# Patient Record
Sex: FEMALE | Race: WHITE | Employment: FULL TIME | URBAN - METROPOLITAN AREA
[De-identification: names, ages, dates, MRNs, and addresses within clinical notes are randomized per-mention and may not be internally consistent; named-entity substitution may affect disease eponyms.]

---

## 2017-05-04 ENCOUNTER — EMPLOYEE WELLNESS (OUTPATIENT)
Dept: OTHER | Age: 57
End: 2017-05-04

## 2017-05-04 LAB
CHOLESTEROL, TOTAL: 183 MG/DL (ref 160–199)
GLUCOSE BLD-MCNC: 88 MG/DL (ref 74–109)
HDLC SERPL-MCNC: 60 MG/DL (ref 65–121)
LDL CHOLESTEROL CALCULATED: 93 MG/DL
TRIGL SERPL-MCNC: 152 MG/DL (ref 150–199)

## 2018-03-20 VITALS — WEIGHT: 117 LBS

## 2022-04-09 ENCOUNTER — OFFICE VISIT (OUTPATIENT)
Age: 62
End: 2022-04-09
Payer: COMMERCIAL

## 2022-04-09 VITALS
OXYGEN SATURATION: 96 % | BODY MASS INDEX: 20.2 KG/M2 | TEMPERATURE: 98 F | HEART RATE: 70 BPM | DIASTOLIC BLOOD PRESSURE: 62 MMHG | RESPIRATION RATE: 18 BRPM | SYSTOLIC BLOOD PRESSURE: 110 MMHG | HEIGHT: 63 IN | WEIGHT: 114 LBS

## 2022-04-09 DIAGNOSIS — A08.4 VIRAL GASTROENTERITIS: Primary | ICD-10-CM

## 2022-04-09 PROCEDURE — 99203 OFFICE O/P NEW LOW 30 MIN: CPT

## 2022-04-09 RX ORDER — HYDROCODONE BITARTRATE AND ACETAMINOPHEN 7.5; 325 MG/1; MG/1
1 TABLET ORAL 3 TIMES DAILY
Status: ON HOLD | COMMUNITY
Start: 2022-04-01 | End: 2022-10-14 | Stop reason: HOSPADM

## 2022-04-09 RX ORDER — ONDANSETRON 4 MG/1
4 TABLET, ORALLY DISINTEGRATING ORAL EVERY 8 HOURS PRN
Qty: 20 TABLET | Refills: 0 | Status: ON HOLD | OUTPATIENT
Start: 2022-04-09 | End: 2022-10-14 | Stop reason: HOSPADM

## 2022-04-09 RX ORDER — OMEPRAZOLE 20 MG/1
CAPSULE, DELAYED RELEASE ORAL
Status: ON HOLD | COMMUNITY
Start: 2022-03-21 | End: 2022-10-14 | Stop reason: HOSPADM

## 2022-04-09 RX ORDER — CITALOPRAM 20 MG/1
20 TABLET ORAL DAILY
Status: ON HOLD | COMMUNITY
Start: 2022-03-21 | End: 2022-10-14 | Stop reason: HOSPADM

## 2022-04-09 RX ORDER — FENOFIBRATE 160 MG/1
160 TABLET ORAL DAILY
COMMUNITY
Start: 2022-03-21

## 2022-04-09 RX ORDER — PRAVASTATIN SODIUM 40 MG
TABLET ORAL
COMMUNITY
Start: 2022-03-21

## 2022-04-09 RX ORDER — ALPRAZOLAM 1 MG/1
1 TABLET ORAL NIGHTLY
Status: ON HOLD | COMMUNITY
Start: 2022-03-21 | End: 2022-10-14 | Stop reason: HOSPADM

## 2022-04-09 ASSESSMENT — ENCOUNTER SYMPTOMS
DIARRHEA: 1
ABDOMINAL DISTENTION: 1
ABDOMINAL PAIN: 1
NAUSEA: 1
VOMITING: 0

## 2022-04-09 NOTE — PATIENT INSTRUCTIONS
1. Zofran as needed for nausea/vomiting  2. Rest  3. Hydrate with water or gatorade, popsicles or ice chips  4. If symptoms worsen, follow up with PCP or return to urgent care  5. Immodium as needed to slow diarrhea, do not try to stop it completely  6. Slowly introduce BRAT diet- bananas, rice, applesauce and toast  7. If abdominal pain is no longer crampy and generalized and is in one spot - seek reevaluation      Patient Education        Gastroenteritis: Care Instructions  Overview     Gastroenteritis is an illness that may cause nausea, vomiting, and diarrhea. Itcan be caused by bacteria or a virus. You will probably begin to feel better in 1 to 2 days. In the meantime, get plenty of rest and make sure you do not become dehydrated. Dehydration occurswhen your body loses too much fluid. Follow-up care is a key part of your treatment and safety. Be sure to make and go to all appointments, and call your doctor if you are having problems. It's also a good idea to know your test results and keep alist of the medicines you take. How can you care for yourself at home?  If your doctor prescribed antibiotics, take them as directed. Do not stop taking them just because you feel better. You need to take the full course of antibiotics.  Drink plenty of fluids to prevent dehydration. Choose water and other clear liquids until you feel better. If you have kidney, heart, or liver disease and have to limit fluids, talk with your doctor before you increase your fluid intake.  Drink fluids slowly, in frequent, small amounts, because drinking too much too fast can cause vomiting.  Begin eating mild foods, such as dry toast, yogurt, applesauce, bananas, and rice. Avoid spicy, hot, or high-fat foods, and do not drink alcohol or caffeine for a day or two. Do not drink milk or eat ice cream until you are feeling better. How to prevent gastroenteritis   Keep hot foods hot and cold foods cold.    Do not eat meats, dressings, salads, or other foods that have been kept at room temperature for more than 2 hours.  Use a thermometer to check your refrigerator. It should be between 34°F and 40°F.   Defrost meats in the refrigerator or microwave, not on the kitchen counter.  Keep your hands and your kitchen clean. Wash your hands, cutting boards, and countertops with hot soapy water frequently.  Cook meat until it is well done.  Do not eat raw eggs or uncooked sauces made with raw eggs.  Do not take chances. If food looks or tastes spoiled, throw it out. When should you call for help? Call 911 anytime you think you may need emergency care. For example, call if:     You vomit blood or what looks like coffee grounds.      You passed out (lost consciousness).      You pass maroon or very bloody stools. Call your doctor now or seek immediate medical care if:     You have severe belly pain.      You have signs of needing more fluids. You have sunken eyes, a dry mouth, and pass only a little urine.      You feel like you are going to faint.      You have increased belly pain that does not go away in 1 to 2 days.      You have new or increased nausea, or you are vomiting.      You have a new or higher fever.      Your stools are black and tarlike or have streaks of blood. Watch closely for changes in your health, and be sure to contact your doctor if:     You are dizzy or lightheaded.      You urinate less than usual, or your urine is dark yellow or brown.      You do not feel better with each day that goes by. Where can you learn more? Go to https://Valor Medicalovidio.Skillshare. org and sign in to your [employer] account. Enter N142 in the [facility] box to learn more about \"Gastroenteritis: Care Instructions. \"     If you do not have an account, please click on the \"Sign Up Now\" link. Current as of: July 1, 2021               Content Version: 13.2  © 9631-3606 Healthwise, [facility].    Care instructions adapted under license by [facility] ([facility]). If you have questions about a medical condition or this instruction, always ask your healthcare professional. [address] any warranty or liability for your use of this information.

## 2022-04-09 NOTE — PROGRESS NOTES
[address]  [address] [address] [place] [address]  Dept: [phone]  Dept Fax: [phone]  Loc: [phone]    Serge Joyce is a 64 y.o. female who presents today for her medical conditions/complaints as noted below. Oliverio Carrasquillo is c/o of Diarrhea (after eating, s/s started on 4/8/2022) and Other (belching frequently)        HPI:     HPI  Oliverio Thomas presents with complaints of abdominal cramping, bloating, belching and diarrhea since 2 AM this morning. She reports she was awoken with its sudden onset. She denies fever and vomiting. Reports she has been around children this week. She has been drinking water and reports normal urine output. She denies focal pain. She reports decreased appetite. Past Medical History:   Diagnosis Date    Hyperlipidemia     Osteoarthritis      History reviewed. No pertinent surgical history. History reviewed. No pertinent family history. Social History     Tobacco Use    Smoking status: Current Every Day Smoker    Smokeless tobacco: Never Used   Substance Use Topics    Alcohol use: Not on file      Current Outpatient Medications   Medication Sig Dispense Refill    ALPRAZolam (XANAX) 1 MG tablet       citalopram (CELEXA) 20 MG tablet       fenofibrate (TRIGLIDE) 160 MG tablet       HYDROcodone-acetaminophen (NORCO) 7.5-325 MG per tablet       omeprazole (PRILOSEC) 20 MG delayed release capsule       pravastatin (PRAVACHOL) 40 MG tablet       ondansetron (ZOFRAN ODT) 4 MG disintegrating tablet Take 1 tablet by mouth every 8 hours as needed for Nausea or Vomiting 20 tablet 0     No current facility-administered medications for this visit.      No Known Allergies    Health Maintenance   Topic Date Due    Hepatitis C screen  Never done    COVID-19 Vaccine (1) Never done    Depression Screen  Never done    HIV screen  Never done    DTaP/Tdap/Td vaccine (1 - Tdap) Never done    Cervical cancer screen  Never done    Colorectal Cancer Screen  Never done    Breast cancer screen  Never done    Shingles Vaccine (1 of 2) Never done    Lipid screen  05/04/2018    Flu vaccine (Season Ended) 09/01/2022    Hepatitis A vaccine  Aged Out    Hepatitis B vaccine  Aged Out    Hib vaccine  Aged Out    Meningococcal (ACWY) vaccine  Aged Out    Pneumococcal 0-64 years Vaccine  Aged Out       Subjective:     Review of Systems   Constitutional: Negative for fever. Gastrointestinal: Positive for abdominal distention, abdominal pain, diarrhea and nausea. Negative for vomiting.       :Objective      Physical Exam  Constitutional:       General: She is not in acute distress. Appearance: Normal appearance. She is ill-appearing. She is not toxic-appearing. HENT:      Head: Normocephalic and atraumatic. Right Ear: External ear normal.      Left Ear: External ear normal.      Nose: Nose normal.      Mouth/Throat:      Mouth: Mucous membranes are moist.   Eyes:      General:         Right eye: No discharge. Left eye: No discharge. Conjunctiva/sclera: Conjunctivae normal.   Cardiovascular:      Rate and Rhythm: Normal rate and regular rhythm. Pulmonary:      Effort: Pulmonary effort is normal. No respiratory distress. Abdominal:      General: Abdomen is flat. Bowel sounds are increased. Palpations: Abdomen is soft. Tenderness: There is abdominal tenderness (mild) in the right lower quadrant. There is no guarding or rebound. Comments: Advised pt if RLQ worsened to seek reevaluation immediately   Musculoskeletal:         General: Normal range of motion. Cervical back: Normal range of motion. Lymphadenopathy:      Cervical: No cervical adenopathy. Skin:     General: Skin is warm and dry. Capillary Refill: Capillary refill takes less than 2 seconds. Findings: No rash. Neurological:      General: No focal deficit present. Mental Status: She is alert. Psychiatric:         Mood and Affect: Mood normal.       /62   Pulse 70   Temp 98 °F (36.7 °C) (Temporal)   Resp 18   Ht 5' 3\" (1.6 m)   Wt 114 lb (51.7 kg)   SpO2 96%   BMI 20.19 kg/m²     :Assessment       Diagnosis Orders   1. Viral gastroenteritis  ondansetron (ZOFRAN ODT) 4 MG disintegrating tablet       :Plan    No orders of the defined types were placed in this encounter. zofran prn. Increase po intake. Monitor for worsening RLQ pain. Return precautions and home care education completed. Patient verbalized understanding. Return if symptoms worsen or fail to improve. Orders Placed This Encounter   Medications    ondansetron (ZOFRAN ODT) 4 MG disintegrating tablet     Sig: Take 1 tablet by mouth every 8 hours as needed for Nausea or Vomiting     Dispense:  20 tablet     Refill:  0       Patient given educational materials- see patient instructions. Discussed use, benefit, and side effects of prescribed medications. All patient questions answered. Pt voiced understanding. Patient Instructions     1. Zofran as needed for nausea/vomiting  2. Rest  3. Hydrate with water or gatorade, popsicles or ice chips  4. If symptoms worsen, follow up with PCP or return to urgent care  5. Immodium as needed to slow diarrhea, do not try to stop it completely  6. Slowly introduce BRAT diet- bananas, rice, applesauce and toast  7. If abdominal pain is no longer crampy and generalized and is in one spot - seek reevaluation      Patient Education        Gastroenteritis: Care Instructions  Overview     Gastroenteritis is an illness that may cause nausea, vomiting, and diarrhea. Itcan be caused by bacteria or a virus. You will probably begin to feel better in 1 to 2 days. In the meantime, get plenty of rest and make sure you do not become dehydrated. Dehydration occurswhen your body loses too much fluid. Follow-up care is a key part of your treatment and safety.  Be sure to make and go to all appointments, and call your doctor if you are having problems. It's also a good idea to know your test results and keep alist of the medicines you take. How can you care for yourself at home?  If your doctor prescribed antibiotics, take them as directed. Do not stop taking them just because you feel better. You need to take the full course of antibiotics.  Drink plenty of fluids to prevent dehydration. Choose water and other clear liquids until you feel better. If you have kidney, heart, or liver disease and have to limit fluids, talk with your doctor before you increase your fluid intake.  Drink fluids slowly, in frequent, small amounts, because drinking too much too fast can cause vomiting.  Begin eating mild foods, such as dry toast, yogurt, applesauce, bananas, and rice. Avoid spicy, hot, or high-fat foods, and do not drink alcohol or caffeine for a day or two. Do not drink milk or eat ice cream until you are feeling better. How to prevent gastroenteritis   Keep hot foods hot and cold foods cold.  Do not eat meats, dressings, salads, or other foods that have been kept at room temperature for more than 2 hours.  Use a thermometer to check your refrigerator. It should be between 34°F and 40°F.   Defrost meats in the refrigerator or microwave, not on the kitchen counter.  Keep your hands and your kitchen clean. Wash your hands, cutting boards, and countertops with hot soapy water frequently.  Cook meat until it is well done.  Do not eat raw eggs or uncooked sauces made with raw eggs.  Do not take chances. If food looks or tastes spoiled, throw it out. When should you call for help? Call 911 anytime you think you may need emergency care. For example, call if:     You vomit blood or what looks like coffee grounds.      You passed out (lost consciousness).      You pass maroon or very bloody stools. Call your doctor now or seek immediate medical care if:     You have severe belly pain.    You have signs of needing more fluids. You have sunken eyes, a dry mouth, and pass only a little urine.      You feel like you are going to faint.      You have increased belly pain that does not go away in 1 to 2 days.      You have new or increased nausea, or you are vomiting.      You have a new or higher fever.      Your stools are black and tarlike or have streaks of blood. Watch closely for changes in your health, and be sure to contact your doctor if:     You are dizzy or lightheaded.      You urinate less than usual, or your urine is dark yellow or brown.      You do not feel better with each day that goes by. Where can you learn more? Go to https://Preact.Chug. org and sign in to your [employer] account. Enter N142 in the [employer] box to learn more about \"Gastroenteritis: Care Instructions. \"     If you do not have an account, please click on the \"Sign Up Now\" link. Current as of: July 1, 2021               Content Version: 13.2  © 2006-2022 Healthwise, Incorporated. Care instructions adapted under license by [facility] ([facility]). If you have questions about a medical condition or this instruction, always ask your healthcare professional. [address] [address] any warranty or liability for your use of this information.                Electronically signed by BENITA Golden CNP on 4/9/2022 at 10:12 AM

## 2022-10-08 ENCOUNTER — APPOINTMENT (OUTPATIENT)
Dept: GENERAL RADIOLOGY | Age: 62
DRG: 312 | End: 2022-10-08
Payer: COMMERCIAL

## 2022-10-08 ENCOUNTER — HOSPITAL ENCOUNTER (INPATIENT)
Age: 62
LOS: 2 days | Discharge: PSYCHIATRIC OR SUBSTANCE USE FACILITY | DRG: 312 | End: 2022-10-10
Attending: EMERGENCY MEDICINE | Admitting: HOSPITALIST
Payer: COMMERCIAL

## 2022-10-08 ENCOUNTER — APPOINTMENT (OUTPATIENT)
Dept: CT IMAGING | Age: 62
DRG: 312 | End: 2022-10-08
Payer: COMMERCIAL

## 2022-10-08 DIAGNOSIS — E87.1 HYPONATREMIA: ICD-10-CM

## 2022-10-08 DIAGNOSIS — R55 SYNCOPE AND COLLAPSE: ICD-10-CM

## 2022-10-08 DIAGNOSIS — F32.A DEPRESSION, UNSPECIFIED DEPRESSION TYPE: Primary | ICD-10-CM

## 2022-10-08 PROBLEM — Q87.89 VENTRICULAR EXTRASYSTOLES-SYNCOPAL EPISODES-PERODACTYLY-ROBIN SEQUENCE SYNDROME: Status: ACTIVE | Noted: 2022-10-08

## 2022-10-08 PROBLEM — F17.200 SMOKER: Status: ACTIVE | Noted: 2022-10-08

## 2022-10-08 PROBLEM — K21.9 GERD (GASTROESOPHAGEAL REFLUX DISEASE): Status: ACTIVE | Noted: 2022-10-08

## 2022-10-08 PROBLEM — F17.200 SMOKER: Status: RESOLVED | Noted: 2022-10-08 | Resolved: 2022-10-08

## 2022-10-08 PROBLEM — Q87.89 VENTRICULAR EXTRASYSTOLES-SYNCOPAL EPISODES-PERODACTYLY-ROBIN SEQUENCE SYNDROME: Status: RESOLVED | Noted: 2022-10-08 | Resolved: 2022-10-08

## 2022-10-08 PROBLEM — E78.5 HYPERLIPIDEMIA: Status: ACTIVE | Noted: 2022-10-08

## 2022-10-08 PROBLEM — K86.81 EXOCRINE PANCREATIC INSUFFICIENCY: Status: ACTIVE | Noted: 2022-10-08

## 2022-10-08 LAB
ACETAMINOPHEN LEVEL: <15 UG/ML
ALBUMIN SERPL-MCNC: 4 G/DL (ref 3.5–5.2)
ALP BLD-CCNC: 63 U/L (ref 35–104)
ALT SERPL-CCNC: 25 U/L (ref 5–33)
AMPHETAMINE SCREEN, URINE: NEGATIVE
ANION GAP SERPL CALCULATED.3IONS-SCNC: 11 MMOL/L (ref 7–19)
AST SERPL-CCNC: 24 U/L (ref 5–32)
BARBITURATE SCREEN URINE: NEGATIVE
BENZODIAZEPINE SCREEN, URINE: NEGATIVE
BILIRUB SERPL-MCNC: 0.3 MG/DL (ref 0.2–1.2)
BILIRUBIN URINE: NEGATIVE
BLOOD, URINE: NEGATIVE
BUN BLDV-MCNC: 11 MG/DL (ref 8–23)
BUPRENORPHINE URINE: NEGATIVE
CALCIUM SERPL-MCNC: 8.9 MG/DL (ref 8.8–10.2)
CANNABINOID SCREEN URINE: NEGATIVE
CHLORIDE BLD-SCNC: 96 MMOL/L (ref 98–111)
CLARITY: CLEAR
CO2: 22 MMOL/L (ref 22–29)
COCAINE METABOLITE SCREEN URINE: NEGATIVE
COLOR: YELLOW
CREAT SERPL-MCNC: 0.5 MG/DL (ref 0.5–0.9)
D DIMER: 0.34 UG/ML FEU (ref 0–0.48)
ETHANOL: <10 MG/DL (ref 0–0.08)
GFR AFRICAN AMERICAN: >59
GFR NON-AFRICAN AMERICAN: >60
GLUCOSE BLD-MCNC: 133 MG/DL (ref 74–109)
GLUCOSE URINE: NEGATIVE MG/DL
HCT VFR BLD CALC: 39.6 % (ref 37–47)
HEMOGLOBIN: 13.7 G/DL (ref 12–16)
KETONES, URINE: NEGATIVE MG/DL
LEUKOCYTE ESTERASE, URINE: NEGATIVE
LIPASE: 43 U/L (ref 13–60)
Lab: ABNORMAL
MCH RBC QN AUTO: 32.5 PG (ref 27–31)
MCHC RBC AUTO-ENTMCNC: 34.6 G/DL (ref 33–37)
MCV RBC AUTO: 94.1 FL (ref 81–99)
METHADONE SCREEN, URINE: NEGATIVE
METHAMPHETAMINE, URINE: NEGATIVE
NITRITE, URINE: NEGATIVE
OPIATE SCREEN URINE: POSITIVE
OXYCODONE URINE: NEGATIVE
PDW BLD-RTO: 12.5 % (ref 11.5–14.5)
PH UA: 5.5 (ref 5–8)
PHENCYCLIDINE SCREEN URINE: NEGATIVE
PLATELET # BLD: 208 K/UL (ref 130–400)
PMV BLD AUTO: 11.7 FL (ref 9.4–12.3)
POTASSIUM SERPL-SCNC: 3.8 MMOL/L (ref 3.5–5)
PROLACTIN: 20.77 NG/ML (ref 4.79–23.3)
PROPOXYPHENE SCREEN: NEGATIVE
PROTEIN UA: NEGATIVE MG/DL
RBC # BLD: 4.21 M/UL (ref 4.2–5.4)
SALICYLATE, SERUM: <3 MG/DL (ref 3–10)
SARS-COV-2, NAAT: NOT DETECTED
SODIUM BLD-SCNC: 129 MMOL/L (ref 136–145)
SPECIFIC GRAVITY UA: 1.02 (ref 1–1.03)
T4 FREE: 1.87 NG/DL (ref 0.93–1.7)
TOTAL PROTEIN: 6.5 G/DL (ref 6.6–8.7)
TRICYCLIC, URINE: NEGATIVE
TROPONIN: <0.01 NG/ML (ref 0–0.03)
TSH REFLEX FT4: 4.54 UIU/ML (ref 0.35–5.5)
UROBILINOGEN, URINE: 1 E.U./DL
WBC # BLD: 8.9 K/UL (ref 4.8–10.8)

## 2022-10-08 PROCEDURE — 85379 FIBRIN DEGRADATION QUANT: CPT

## 2022-10-08 PROCEDURE — 71045 X-RAY EXAM CHEST 1 VIEW: CPT | Performed by: RADIOLOGY

## 2022-10-08 PROCEDURE — 81003 URINALYSIS AUTO W/O SCOPE: CPT

## 2022-10-08 PROCEDURE — 83690 ASSAY OF LIPASE: CPT

## 2022-10-08 PROCEDURE — 36415 COLL VENOUS BLD VENIPUNCTURE: CPT

## 2022-10-08 PROCEDURE — 99285 EMERGENCY DEPT VISIT HI MDM: CPT

## 2022-10-08 PROCEDURE — 80179 DRUG ASSAY SALICYLATE: CPT

## 2022-10-08 PROCEDURE — 85027 COMPLETE CBC AUTOMATED: CPT

## 2022-10-08 PROCEDURE — 6370000000 HC RX 637 (ALT 250 FOR IP): Performed by: NURSE PRACTITIONER

## 2022-10-08 PROCEDURE — 84146 ASSAY OF PROLACTIN: CPT

## 2022-10-08 PROCEDURE — G0378 HOSPITAL OBSERVATION PER HR: HCPCS

## 2022-10-08 PROCEDURE — 80053 COMPREHEN METABOLIC PANEL: CPT

## 2022-10-08 PROCEDURE — 2140000000 HC CCU INTERMEDIATE R&B

## 2022-10-08 PROCEDURE — 71045 X-RAY EXAM CHEST 1 VIEW: CPT

## 2022-10-08 PROCEDURE — 70450 CT HEAD/BRAIN W/O DYE: CPT | Performed by: RADIOLOGY

## 2022-10-08 PROCEDURE — 80306 DRUG TEST PRSMV INSTRMNT: CPT

## 2022-10-08 PROCEDURE — 84439 ASSAY OF FREE THYROXINE: CPT

## 2022-10-08 PROCEDURE — 87635 SARS-COV-2 COVID-19 AMP PRB: CPT

## 2022-10-08 PROCEDURE — 93005 ELECTROCARDIOGRAM TRACING: CPT | Performed by: EMERGENCY MEDICINE

## 2022-10-08 PROCEDURE — 84484 ASSAY OF TROPONIN QUANT: CPT

## 2022-10-08 PROCEDURE — 84443 ASSAY THYROID STIM HORMONE: CPT

## 2022-10-08 PROCEDURE — 70450 CT HEAD/BRAIN W/O DYE: CPT

## 2022-10-08 PROCEDURE — 80143 DRUG ASSAY ACETAMINOPHEN: CPT

## 2022-10-08 PROCEDURE — 2580000003 HC RX 258: Performed by: NURSE PRACTITIONER

## 2022-10-08 PROCEDURE — 82077 ASSAY SPEC XCP UR&BREATH IA: CPT

## 2022-10-08 RX ORDER — SODIUM CHLORIDE 9 MG/ML
INJECTION, SOLUTION INTRAVENOUS PRN
Status: DISCONTINUED | OUTPATIENT
Start: 2022-10-08 | End: 2022-10-10 | Stop reason: HOSPADM

## 2022-10-08 RX ORDER — MAGNESIUM SULFATE IN WATER 40 MG/ML
2000 INJECTION, SOLUTION INTRAVENOUS PRN
Status: DISCONTINUED | OUTPATIENT
Start: 2022-10-08 | End: 2022-10-10 | Stop reason: HOSPADM

## 2022-10-08 RX ORDER — ONDANSETRON 2 MG/ML
4 INJECTION INTRAMUSCULAR; INTRAVENOUS EVERY 6 HOURS PRN
Status: DISCONTINUED | OUTPATIENT
Start: 2022-10-08 | End: 2022-10-10 | Stop reason: HOSPADM

## 2022-10-08 RX ORDER — SODIUM CHLORIDE 9 MG/ML
25 INJECTION, SOLUTION INTRAVENOUS PRN
Status: DISCONTINUED | OUTPATIENT
Start: 2022-10-08 | End: 2022-10-10 | Stop reason: HOSPADM

## 2022-10-08 RX ORDER — ENOXAPARIN SODIUM 100 MG/ML
40 INJECTION SUBCUTANEOUS EVERY 24 HOURS
Status: DISCONTINUED | OUTPATIENT
Start: 2022-10-08 | End: 2022-10-09

## 2022-10-08 RX ORDER — POTASSIUM CHLORIDE 7.45 MG/ML
10 INJECTION INTRAVENOUS PRN
Status: DISCONTINUED | OUTPATIENT
Start: 2022-10-08 | End: 2022-10-10 | Stop reason: HOSPADM

## 2022-10-08 RX ORDER — POLYETHYLENE GLYCOL 3350 17 G/17G
17 POWDER, FOR SOLUTION ORAL DAILY PRN
Status: DISCONTINUED | OUTPATIENT
Start: 2022-10-08 | End: 2022-10-10 | Stop reason: HOSPADM

## 2022-10-08 RX ORDER — SODIUM CHLORIDE 0.9 % (FLUSH) 0.9 %
5-40 SYRINGE (ML) INJECTION EVERY 12 HOURS SCHEDULED
Status: DISCONTINUED | OUTPATIENT
Start: 2022-10-08 | End: 2022-10-10 | Stop reason: HOSPADM

## 2022-10-08 RX ORDER — ACETAMINOPHEN 650 MG/1
650 SUPPOSITORY RECTAL EVERY 6 HOURS PRN
Status: DISCONTINUED | OUTPATIENT
Start: 2022-10-08 | End: 2022-10-10 | Stop reason: HOSPADM

## 2022-10-08 RX ORDER — SODIUM CHLORIDE 9 MG/ML
INJECTION, SOLUTION INTRAVENOUS CONTINUOUS
Status: DISCONTINUED | OUTPATIENT
Start: 2022-10-08 | End: 2022-10-10 | Stop reason: HOSPADM

## 2022-10-08 RX ORDER — ACETAMINOPHEN 650 MG/1
650 SUPPOSITORY RECTAL EVERY 6 HOURS PRN
Status: DISCONTINUED | OUTPATIENT
Start: 2022-10-08 | End: 2022-10-09 | Stop reason: SDUPTHER

## 2022-10-08 RX ORDER — SODIUM CHLORIDE 0.9 % (FLUSH) 0.9 %
5-40 SYRINGE (ML) INJECTION PRN
Status: DISCONTINUED | OUTPATIENT
Start: 2022-10-08 | End: 2022-10-10 | Stop reason: HOSPADM

## 2022-10-08 RX ORDER — ACETAMINOPHEN 325 MG/1
650 TABLET ORAL EVERY 6 HOURS PRN
Status: DISCONTINUED | OUTPATIENT
Start: 2022-10-08 | End: 2022-10-10 | Stop reason: HOSPADM

## 2022-10-08 RX ORDER — ACETAMINOPHEN 325 MG/1
650 TABLET ORAL EVERY 6 HOURS PRN
Status: DISCONTINUED | OUTPATIENT
Start: 2022-10-08 | End: 2022-10-09 | Stop reason: SDUPTHER

## 2022-10-08 RX ORDER — ONDANSETRON 4 MG/1
4 TABLET, ORALLY DISINTEGRATING ORAL EVERY 8 HOURS PRN
Status: DISCONTINUED | OUTPATIENT
Start: 2022-10-08 | End: 2022-10-10 | Stop reason: HOSPADM

## 2022-10-08 RX ADMIN — SODIUM CHLORIDE, PRESERVATIVE FREE 10 ML: 5 INJECTION INTRAVENOUS at 19:56

## 2022-10-08 RX ADMIN — PANCRELIPASE 12000 UNITS: 60000; 12000; 38000 CAPSULE, DELAYED RELEASE PELLETS ORAL at 19:56

## 2022-10-08 ASSESSMENT — ENCOUNTER SYMPTOMS
ABDOMINAL PAIN: 0
EYE PAIN: 0
DIARRHEA: 0
SHORTNESS OF BREATH: 0
NAUSEA: 0
COUGH: 1
BLOOD IN STOOL: 0
VOMITING: 0

## 2022-10-08 NOTE — H&P
[place], [place], [address]    DEPARTMENT OF HOSPITALIST MEDICINE        HISTORY & PHYSICAL:          REASON FOR ADMISSION:  Chief Complaint   Patient presents with    Seizures        HISTORY OF PRESENT ILLNESS:  Milan Sampson is an 64 y.o. female past medical history of chronic low back pain, anxiety, depression, syncope and collapse today, EPI on Creon replacement. Patient presents to the emergency room via EMS.  reports she came up behind him was talking gibberish and he turned around she was passing out and starting to fall he caught her and they both went to the floor. He called EMS and she woke up in the back of an ambulance and did not have any recollection of why she was there. Patient gives a recent history of 10 pound weight loss in a 2-week timeframe, she was seen by her PCP earlier this week and wanting to come off Norco and Xanax and he gave her an unknown sample to help with any kind of withdrawal symptoms per her . .  She stopped her Celexa and started this new medication as she cannot remember the name. Has been well message her when he goes home. Patient is tachycardic 100-130 in the ED. it is sinus. She states her heart rate runs in the 100's. Laboratory eval sodium 129 potassium 3.8 glucose 133 troponin 0.01 liver panel normal TSH 4.54 Free T4 1.87 drug screen positive for opiates White count 8.9, hemoglobin 13.7 hematocrit 39.6. Urine negative. On exam patient's awake alert and oriented. Is any chest pain shortness of breath weakness headache or extreme fatigue  . Patient admitted to hospital service. PAST MEDICAL HISTORY:  Past Medical History:   Diagnosis Date    Hyperlipidemia     Osteoarthritis          PAST SURGICAL HISTORY:  No past surgical history on file.      SOCIAL HISTORY:  Social History     Socioeconomic History    Marital status:    Tobacco Use    Smoking status: Every Day    Smokeless tobacco: Never        FAMILY HISTORY:  No family history on file. ALLERGIES:  No Known Allergies     PRIOR TO ADMISSION MEDS:  Not in a hospital admission. REVIEW OF SYSTEMS:  Constitutional:  No fevers, chills, nausea, vomiting, + tiredness & fatigue   Head:  No head injury, facial trauma   Eyes:  No acute visual changes, exudate, trauma   Ears:  No acute hearing loss, earaches   Nose: No nasal discharge, epistaxis   Neck: No new hoarseness, voice change, or new masses   Lungs:   No hemoptysis, pleurisy   Heart:  No chest pressure with exertion, palpitations, tachycardia   Abdomen:   No new masses, no bright red blood per rectum   Extremities: No acute pain while ambulating, no new lesions   Skin: No new changes in skin color, no rashes or lesions   Neurologic: No new motor or sensory changes     14 point review of systems addressed with patient which is essentially negative except as specifically addressed above:    PHYSICAL EXAM:  /83   Pulse (!) 119   Temp 98 °F (36.7 °C) (Oral)   Resp 18   Ht 5' 3\" (1.6 m)   Wt 120 lb (54.4 kg)   SpO2 96%   BMI 21.26 kg/m²   No intake/output data recorded.       PHYSICAL EXAMINATION:    Vital Signs: Please see the chart   ESTIVEN:  Awake, alert, oriented x 3, patient appears tired and fatigued   Head/Eyes:  Normocephalic, atraumatic, EOMI and PERRLA bilaterally   ENT: Moist mucous membranes, nasal passages clear   Neck: Supple, full range of motion, no carotid bruit, trachea midline   Respiratory:   Bilateral fair air entry in both lung fields, mild B/L crackles, symmetric expansion of chest   Cardiovascular:  Regular rate and rhythm, tachycardic S1+S2+0, no murmurs/rubs   Urology: No bilateral CVA tenderness, no suprapubic tenderness   Abdomen:   Soft, non-tender, bowel sounds +ve, no organomegaly   Muscle/Joints: Moves all, full range of motion, no muscle spasms   Extremities: No clubbing, no cyanosis, no calf tenderness, no edema   Pulses: 2+ bilaterally, symmetrical   Skin: Warm, dry, no pallor/cyanosis/jaundice, no rashes/lesions   Neurologic: Awake, alert, oriented x 3, cranial nerves II-XII intact, no focal neurological deficits, sensory system intact   Psychiatric: Normal mood, non-suicidal         LABORATORY DATA:    CBC:  Recent Labs     10/08/22  1312   WBC 8.9   HGB 13.7   HCT 39.6        BMP:  Recent Labs     10/08/22  1312   *   K 3.8   CL 96*   CO2 22   BUN 11   CREATININE 0.5   CALCIUM 8.9     Recent Labs     10/08/22  1312   AST 24   ALT 25   BILITOT 0.3   ALKPHOS 63     Coag Panel: No results for input(s): INR, PROTIME, APTT in the last 72 hours. Cardiac Enzymes:   Recent Labs     10/08/22  1312   TROPONINI <0.01     ABGs:No results found for: PHART, PO2ART, APQ9POY  Urinalysis:  Lab Results   Component Value Date/Time    NITRU Negative 10/08/2022 02:21 PM    BLOODU Negative 10/08/2022 02:21 PM    SPECGRAV 1.017 10/08/2022 02:21 PM    GLUCOSEU Negative 10/08/2022 02:21 PM     A1C: No results for input(s): LABA1C in the last 72 hours. ABG:No results for input(s): PHART, NKJ7XZG, PO2ART, EOL2UCX, BEART, HGBAE, N8WQIBJQ, CARBOXHGBART in the last 72 hours. EKG:   Please see chart      IMAGING:  CT HEAD WO CONTRAST    Result Date: 10/8/2022  1. No acute intracranial abnormality is present. A chronic infarct is in the right periventricular region measuring 7 mm. There are calcifications of the bilateral basal ganglia. Focal right frontoparietal atrophy. Recommendation: Follow up as clinically indicated. All CT scans at this facility utilize dose modulation, iterative reconstruction, and/or weight based dosing when appropriate to reduce radiation dose to as low as reasonably achievable. Electronically Signed by Majo Mcnamara MD at 08-Oct-2022 04:32:36 PM             XR CHEST PORTABLE    Result Date: 10/8/2022  There is no acute infiltrate or pleural effusion. Recommendation: Follow up as clinically indicated.  Electronically Signed by Madhavi Ramirez MD, SA CERTIFIED at 08-Oct-2022 03:27:51 PM                 Assessment and Plan:    Principal Problem (Resolved):  Syncope  Active Problems:    Syncope and collapse   Telemetry   Prolactin lab   seizure precaution   Fluids at 125   Daily lab    Depression   Need to ascertain the new medication started by Dr. Dot Matos earlier this week    Smoker   Noted    GERD (gastroesophageal reflux disease)   PPI    Hyperlipidemia   Lipid panel    Hyponatremia   Strict intake and output   Normal saline at 125   Hold SSRI  EPI   Creon 2 tablets with meals 1 with snacks   Nursing to verify         Patient  is on DVT prophylaxis  Current medications reviewed  Lab work reviewed  Radiology/Chest x-ray films reviewed  Discussed with the nurse and addressed all questions/concerns  Discussed with Patient and/or Family at the bedside in detail . .. they verbalize understanding and agree with the management plan. Attestation:  Inpatient status is used for patients with an expected LOS extending past two midnights due to medical therapy and/or critical care needs  . .. all other patients are placed under OBServation status. BENITA Knowles CNP  4:29 PM 10/8/2022      DISCLAIMER: This note was created with electronic voice recognition which does have occasional errors. If you have any questions regarding the content within the note please do not hesitate to contact me. .. Thanks.

## 2022-10-08 NOTE — Clinical Note
Discharge Plan[de-identified] Other/[facility])   Telemetry/Cardiac Monitoring Required?: Yes   Bed request comments: pcu

## 2022-10-08 NOTE — ED NOTES
attempt to call report, no answer. Called charge nurse, no answer. Previous attempt to call report by [facility], unsuccessful. Clinical house aware.       Reed Benavidez RN  10/08/22 0756

## 2022-10-08 NOTE — ED NOTES
Report given and care transferred to [facility], [address] [place] [address]  10/08/22 [id number]

## 2022-10-08 NOTE — ED PROVIDER NOTES
ingestions of any kind. Dipika Re that she takes her medications sporadically but never takes it in excess. Admits to drinking alcohol but says she normally drinks a couple of beers every couple of days. HPI    NursingNotes were reviewed. REVIEW OF SYSTEMS    (2-9 systems for level 4, 10 or more for level 5)     Review of Systems   Constitutional:  Positive for fatigue. Negative for fever. Eyes:  Negative for pain and visual disturbance. Respiratory:  Positive for cough. Negative for shortness of breath. Cardiovascular:  Negative for chest pain, palpitations and leg swelling. Gastrointestinal:  Negative for abdominal pain, blood in stool, diarrhea, nausea and vomiting. Genitourinary:  Negative for difficulty urinating and dysuria. Skin:  Negative for rash. Neurological:  Positive for syncope. Negative for weakness, numbness and headaches. All other systems reviewed and are negative. A complete review of systems was performed and is negative except as noted above in the HPI. PAST MEDICAL HISTORY     Past Medical History:   Diagnosis Date    Hyperlipidemia     Osteoarthritis          SURGICAL HISTORY     History reviewed. No pertinent surgical history. CURRENT MEDICATIONS       Current Discharge Medication List        CONTINUE these medications which have NOT CHANGED    Details   ALPRAZolam (XANAX) 1 MG tablet       citalopram (CELEXA) 20 MG tablet       fenofibrate (TRIGLIDE) 160 MG tablet       HYDROcodone-acetaminophen (NORCO) 7.5-325 MG per tablet       omeprazole (PRILOSEC) 20 MG delayed release capsule       pravastatin (PRAVACHOL) 40 MG tablet       ondansetron (ZOFRAN ODT) 4 MG disintegrating tablet Take 1 tablet by mouth every 8 hours as needed for Nausea or Vomiting  Qty: 20 tablet, Refills: 0    Associated Diagnoses: Viral gastroenteritis             ALLERGIES     Patient has no known allergies. FAMILY HISTORY     History reviewed. No pertinent family history. SOCIAL HISTORY       Social History     Socioeconomic History    Marital status:      Spouse name: None    Number of children: None    Years of education: None    Highest education level: None   Tobacco Use    Smoking status: Every Day    Smokeless tobacco: Never       SCREENINGS    Renetta Coma Scale  Eye Opening: Spontaneous  Best Verbal Response: Oriented  Best Motor Response: Obeys commands  Fossil Coma Scale Score: 15        PHYSICAL EXAM    (up to 7 for level 4, 8 or more for level 5)     ED Triage Vitals [10/08/22 1302]   BP Temp Temp Source Heart Rate Resp SpO2 Height Weight   135/83 98 °F (36.7 °C) Oral (!) 117 18 96 % 5' 3\" (1.6 m) 120 lb (54.4 kg)       Physical Exam  Vitals reviewed. Constitutional:       General: She is not in acute distress. Appearance: She is well-developed. She is not toxic-appearing. HENT:      Head: Normocephalic and atraumatic. Right Ear: Tympanic membrane, ear canal and external ear normal.      Left Ear: Tympanic membrane, ear canal and external ear normal.      Mouth/Throat:      Mouth: Mucous membranes are moist.      Pharynx: Oropharynx is clear. No oropharyngeal exudate or posterior oropharyngeal erythema. Eyes:      General: No scleral icterus. Pupils: Pupils are equal, round, and reactive to light. Neck:      Vascular: No JVD. Cardiovascular:      Rate and Rhythm: Regular rhythm. Tachycardia present. Pulses: Normal pulses. Heart sounds: Normal heart sounds. Pulmonary:      Effort: Pulmonary effort is normal. No respiratory distress. Breath sounds: Normal breath sounds. Abdominal:      General: There is no distension. Palpations: Abdomen is soft. Tenderness: There is no abdominal tenderness. There is no right CVA tenderness, left CVA tenderness, guarding or rebound. Musculoskeletal:         General: No swelling, tenderness, deformity or signs of injury. Normal range of motion.       Cervical back: Normal range of motion and neck supple. No rigidity or tenderness. Right lower leg: No edema. Left lower leg: No edema. Comments: No C, T or L-spine tenderness or step-off. Pelvis stable. No tenderness to arms or legs. Neurovascularly tact distally all 4 extremities. Skin:     General: Skin is warm and dry. Capillary Refill: Capillary refill takes less than 2 seconds. Neurological:      General: No focal deficit present. Mental Status: She is alert and oriented to person, place, and time. GCS: GCS eye subscore is 4. GCS verbal subscore is 5. GCS motor subscore is 6. Cranial Nerves: Cranial nerves 2-12 are intact. Sensory: Sensation is intact. Motor: Motor function is intact. Psychiatric:         Mood and Affect: Mood is depressed. Speech: Speech normal.         Behavior: Behavior normal.      Comments: Patient refuses to answer when specifically asked if homicidal or suicidal       DIAGNOSTIC RESULTS     EKG: All EKG's are interpreted by the Emergency Department Physician who either signs or Co-signs this chart in the absence of a cardiologist.    Sinus tachycardia. Normal QT. Borderline ST changes. RADIOLOGY:   Non-plain film images such as CT, Ultrasound and MRI are read by the radiologist. Yogomewer images are visualized and preliminarily interpreted by the emergency physician with the below findings:        Interpretation per the Radiologist below, if available at the time of this note:    CT HEAD WO CONTRAST   Final Result   1. No acute intracranial abnormality is present. A chronic infarct is in the right periventricular region measuring 7 mm. There are calcifications of the bilateral basal ganglia. Focal right frontoparietal atrophy. Recommendation: Follow up as clinically indicated.    All CT scans at this facility utilize dose modulation, iterative reconstruction, and/or weight based dosing when appropriate to reduce radiation dose to as low as reasonably achievable. Electronically Signed by Shimon Newman MD at 08-Oct-2022 04:32:36 PM               XR CHEST PORTABLE   Final Result   There is no acute infiltrate or pleural effusion. Recommendation: Follow up as clinically indicated. Electronically Signed by Sunni Padilla MD, [facility] CERTIFIED at  03:27:51 PM                     ED BEDSIDE ULTRASOUND:   Performed by ED Physician - none    LABS:  Labs Reviewed   DRUG SCRN, BUPRENORPHINE - Abnormal; Notable for the following components:       Result Value    Opiate Scrn, Ur POSITIVE (*)     All other components within normal limits   CBC - Abnormal; Notable for the following components:    MCH 32.5 (*)     All other components within normal limits   COMPREHENSIVE METABOLIC PANEL - Abnormal; Notable for the following components:    Sodium 129 (*)     Chloride 96 (*)     Glucose 133 (*)     Total Protein 6.5 (*)     All other components within normal limits   T4, FREE - Abnormal; Notable for the following components:    T4 Free 1.87 (*)     All other components within normal limits   COVID-19, RAPID   TSH WITH REFLEX TO FT4   TROPONIN   URINALYSIS WITH REFLEX TO CULTURE   ACETAMINOPHEN LEVEL   ETHANOL   SALICYLATE LEVEL   LIPASE   D-DIMER, QUANTITATIVE   PROLACTIN   CBC WITH AUTO DIFFERENTIAL   BASIC METABOLIC PANEL W/ REFLEX TO MG FOR LOW K       All other labs were within normal range or not returned as of this dictation. EMERGENCY DEPARTMENT COURSE and DIFFERENTIALDIAGNOSIS/MDM:   Vitals:    Vitals:    10/08/22 1632 10/08/22 1800 10/08/22 1818 10/08/22 2131   BP: (!) 140/99 112/82  118/79   Pulse: (!) 113 (!) 106  91   Resp: 18 16  18   Temp: 98 °F (36.7 °C) 97.9 °F (36.6 °C)  97.7 °F (36.5 °C)   TempSrc:    Temporal   SpO2: 97% 100%  97%   Weight:   88 lb 6 oz (40.1 kg)    Height:           MDM    Patient's case discussed with Mckay Morin with hospitalist service who is agreeable plan of care and will admit.   She will follow-up on pending D-dimer. Plan will be for psychiatry consult when medically clear. Patient resting comfortably and updated about plan. CONSULTS:  None    PROCEDURES:  Unless otherwise notedbelow, none     Procedures    FINAL IMPRESSION     1. Depression, unspecified depression type    2. Syncope and collapse    3.  Hyponatremia          DISPOSITION/PLAN   DISPOSITION Admitted 10/08/2022 04:47:28 PM      PATIENT REFERRED TO:  @FUP@    DISCHARGE MEDICATIONS:  Current Discharge Medication List             (Please note that portions of this note were completed with a voice recognition program.  Efforts were made to edit the dictations butoccasionally words are mis-transcribed.)    Keon Encarnacion MD (electronically signed)  AttendingEmergency Physician          Keon Encarnacion MD  10/08/22 2343

## 2022-10-09 LAB
ANION GAP SERPL CALCULATED.3IONS-SCNC: 9 MMOL/L (ref 7–19)
BASOPHILS ABSOLUTE: 0 K/UL (ref 0–0.2)
BASOPHILS RELATIVE PERCENT: 0.3 % (ref 0–1)
BUN BLDV-MCNC: 11 MG/DL (ref 8–23)
CALCIUM SERPL-MCNC: 9.1 MG/DL (ref 8.8–10.2)
CHLORIDE BLD-SCNC: 101 MMOL/L (ref 98–111)
CO2: 28 MMOL/L (ref 22–29)
CREAT SERPL-MCNC: 0.6 MG/DL (ref 0.5–0.9)
EOSINOPHILS ABSOLUTE: 0 K/UL (ref 0–0.6)
EOSINOPHILS RELATIVE PERCENT: 0.5 % (ref 0–5)
GFR AFRICAN AMERICAN: >59
GFR NON-AFRICAN AMERICAN: >60
GLUCOSE BLD-MCNC: 107 MG/DL (ref 74–109)
HCT VFR BLD CALC: 39.9 % (ref 37–47)
HEMOGLOBIN: 13.5 G/DL (ref 12–16)
IMMATURE GRANULOCYTES #: 0 K/UL
LV EF: 58 %
LVEF MODALITY: NORMAL
LYMPHOCYTES ABSOLUTE: 2.1 K/UL (ref 1.1–4.5)
LYMPHOCYTES RELATIVE PERCENT: 23.7 % (ref 20–40)
MAGNESIUM: 2.1 MG/DL (ref 1.6–2.4)
MCH RBC QN AUTO: 32.3 PG (ref 27–31)
MCHC RBC AUTO-ENTMCNC: 33.8 G/DL (ref 33–37)
MCV RBC AUTO: 95.5 FL (ref 81–99)
MONOCYTES ABSOLUTE: 0.8 K/UL (ref 0–0.9)
MONOCYTES RELATIVE PERCENT: 9.1 % (ref 0–10)
NEUTROPHILS ABSOLUTE: 5.7 K/UL (ref 1.5–7.5)
NEUTROPHILS RELATIVE PERCENT: 65.9 % (ref 50–65)
PDW BLD-RTO: 12.8 % (ref 11.5–14.5)
PLATELET # BLD: 254 K/UL (ref 130–400)
PMV BLD AUTO: 10.7 FL (ref 9.4–12.3)
POTASSIUM REFLEX MAGNESIUM: 3.3 MMOL/L (ref 3.5–5)
RBC # BLD: 4.18 M/UL (ref 4.2–5.4)
SODIUM BLD-SCNC: 138 MMOL/L (ref 136–145)
WBC # BLD: 8.6 K/UL (ref 4.8–10.8)

## 2022-10-09 PROCEDURE — G0378 HOSPITAL OBSERVATION PER HR: HCPCS

## 2022-10-09 PROCEDURE — 6360000002 HC RX W HCPCS: Performed by: NURSE PRACTITIONER

## 2022-10-09 PROCEDURE — 85025 COMPLETE CBC W/AUTO DIFF WBC: CPT

## 2022-10-09 PROCEDURE — 93306 TTE W/DOPPLER COMPLETE: CPT

## 2022-10-09 PROCEDURE — 83735 ASSAY OF MAGNESIUM: CPT

## 2022-10-09 PROCEDURE — 96372 THER/PROPH/DIAG INJ SC/IM: CPT

## 2022-10-09 PROCEDURE — 80048 BASIC METABOLIC PNL TOTAL CA: CPT

## 2022-10-09 PROCEDURE — 6370000000 HC RX 637 (ALT 250 FOR IP): Performed by: NURSE PRACTITIONER

## 2022-10-09 PROCEDURE — 36415 COLL VENOUS BLD VENIPUNCTURE: CPT

## 2022-10-09 PROCEDURE — 2140000000 HC CCU INTERMEDIATE R&B

## 2022-10-09 PROCEDURE — 2580000003 HC RX 258: Performed by: NURSE PRACTITIONER

## 2022-10-09 RX ORDER — DICYCLOMINE HYDROCHLORIDE 10 MG/1
10 CAPSULE ORAL
Status: ON HOLD | COMMUNITY
End: 2022-10-14 | Stop reason: HOSPADM

## 2022-10-09 RX ORDER — PANTOPRAZOLE SODIUM 40 MG/1
40 TABLET, DELAYED RELEASE ORAL
Status: DISCONTINUED | OUTPATIENT
Start: 2022-10-09 | End: 2022-10-10 | Stop reason: HOSPADM

## 2022-10-09 RX ORDER — PRAVASTATIN SODIUM 20 MG
40 TABLET ORAL NIGHTLY
Status: DISCONTINUED | OUTPATIENT
Start: 2022-10-09 | End: 2022-10-10 | Stop reason: HOSPADM

## 2022-10-09 RX ORDER — ENOXAPARIN SODIUM 100 MG/ML
30 INJECTION SUBCUTANEOUS EVERY 24 HOURS
Status: DISCONTINUED | OUTPATIENT
Start: 2022-10-09 | End: 2022-10-10 | Stop reason: HOSPADM

## 2022-10-09 RX ORDER — FENOFIBRATE 160 MG/1
160 TABLET ORAL DAILY
Status: DISCONTINUED | OUTPATIENT
Start: 2022-10-09 | End: 2022-10-10 | Stop reason: HOSPADM

## 2022-10-09 RX ADMIN — ENOXAPARIN SODIUM 30 MG: 100 INJECTION SUBCUTANEOUS at 17:31

## 2022-10-09 RX ADMIN — SODIUM CHLORIDE, PRESERVATIVE FREE 10 ML: 5 INJECTION INTRAVENOUS at 07:21

## 2022-10-09 RX ADMIN — PRAVASTATIN SODIUM 40 MG: 20 TABLET ORAL at 19:44

## 2022-10-09 RX ADMIN — PANTOPRAZOLE SODIUM 40 MG: 40 TABLET, DELAYED RELEASE ORAL at 06:18

## 2022-10-09 RX ADMIN — PANCRELIPASE 12000 UNITS: 60000; 12000; 38000 CAPSULE, DELAYED RELEASE PELLETS ORAL at 17:31

## 2022-10-09 RX ADMIN — SODIUM CHLORIDE, PRESERVATIVE FREE 10 ML: 5 INJECTION INTRAVENOUS at 19:44

## 2022-10-09 RX ADMIN — PANCRELIPASE 12000 UNITS: 60000; 12000; 38000 CAPSULE, DELAYED RELEASE PELLETS ORAL at 12:42

## 2022-10-09 RX ADMIN — SODIUM CHLORIDE, PRESERVATIVE FREE 10 ML: 5 INJECTION INTRAVENOUS at 07:22

## 2022-10-09 RX ADMIN — PANCRELIPASE 12000 UNITS: 60000; 12000; 38000 CAPSULE, DELAYED RELEASE PELLETS ORAL at 07:25

## 2022-10-09 RX ADMIN — FENOFIBRATE 160 MG: 160 TABLET ORAL at 07:20

## 2022-10-09 ASSESSMENT — ENCOUNTER SYMPTOMS
ALLERGIC/IMMUNOLOGIC NEGATIVE: 1
GASTROINTESTINAL NEGATIVE: 1
RESPIRATORY NEGATIVE: 1
EYES NEGATIVE: 1

## 2022-10-09 NOTE — PROGRESS NOTES
[facility]      Progress Note    Patient:  Eliza Clay  YOB: 1960  Date of Service: 10/9/2022  MRN: [id number]   Acct: [de-identified]   Primary Care Physician: No primary care provider on file. Advance Directive: Full Code  Admit Date: 10/8/2022       Hospital Day: 1    Portions of this note have been copied forward, however, updated to reflect the most current clinical status of this patient. CHIEF COMPLAINT Syncope    SUBJECTIVE:  Constant motion, cannot sit still. Assures me she is ok and nothing is wrong. She verbalized suicidal ideation in the ED      CUMULATIVE HOSPITAL COURSE:     Eliza Clay is an 64 y.o. female past medical history of chronic low back pain, anxiety, depression, syncope and collapse today, EPI on Creon replacement. Patient presents to the emergency room via EMS.  reports she came up behind him was talking gibberish and he turned around she was passing out and starting to fall he caught her and they both went to the floor. He called EMS and she woke up in the back of an ambulance and did not have any recollection of why she was there. Patient gives a recent history of 10 pound weight loss in a 2-week timeframe, she was seen by her PCP earlier this week and wanting to come off Norco and Xanax and he gave her an unknown sample to help with any kind of withdrawal symptoms per her . .  She stopped her Celexa and started this new medication as she cannot remember the name. Has been well message her when he goes home. Patient is tachycardic 100-130 in the ED. it is sinus. She states her heart rate runs in the 100's. Laboratory eval sodium 129 potassium 3.8 glucose 133 troponin 0.01 liver panel normal TSH 4.54 Free T4 1.87 drug screen positive for opiates White count 8.9, hemoglobin 13.7 hematocrit 39.6. Urine negative. On exam patient's awake alert and oriented.   Denies any chest pain shortness of breath weakness headache or extreme fatigue .  Patient admitted to hospital service. Because of the concern in the ED for suicidal ideation a psych consult was called. Nursing reports Pt restless and refusing to stay in her room. She is agitated and talking rapidly and changing thoughts rapidly. Family notified to come sit with pt. Family brought sample box of Carmina Anderson that she was given by PCP earlier in the week. Sodium normalized. TSH nl..    Review of Systems   Constitutional: Negative. HENT: Negative. Eyes: Negative. Respiratory: Negative. Cardiovascular: Negative. Gastrointestinal: Negative. Endocrine: Negative. Genitourinary: Negative. Musculoskeletal: Negative. Skin: Negative. Allergic/Immunologic: Negative. Neurological: Negative. Hematological: Negative. Psychiatric/Behavioral:  Positive for agitation and decreased concentration. The patient is nervous/anxious and is hyperactive. Objective:   VITALS:  /69   Pulse 97   Temp 97.5 °F (36.4 °C) (Temporal)   Resp 20   Ht 5' 3\" (1.6 m)   Wt 88 lb (39.9 kg)   SpO2 99%   BMI 15.59 kg/m²   24HR INTAKE/OUTPUT:    Intake/Output Summary (Last 24 hours) at 10/9/2022 1318  Last data filed at 10/9/2022 0941  Gross per 24 hour   Intake 860 ml   Output 300 ml   Net 560 ml           Physical Exam  Constitutional:       Appearance: She is ill-appearing. HENT:      Head: Normocephalic. Nose: Nose normal.      Mouth/Throat:      Mouth: Mucous membranes are moist.   Eyes:      Extraocular Movements: Extraocular movements intact. Pupils: Pupils are equal, round, and reactive to light. Cardiovascular:      Rate and Rhythm: Regular rhythm. Tachycardia present. Heart sounds: Normal heart sounds. Pulmonary:      Effort: Pulmonary effort is normal.      Breath sounds: Normal breath sounds. Abdominal:      Palpations: Abdomen is soft. Musculoskeletal:         General: Normal range of motion. Cervical back: Normal range of motion. Skin:     General: Skin is warm and dry. Neurological:      General: No focal deficit present. Mental Status: She is alert. Psychiatric:      Comments: Hyperactive, pacing, anxious  Jumping around from one subject to another          Medications:      sodium chloride      sodium chloride      sodium chloride        fenofibrate  160 mg Oral Daily    pantoprazole  40 mg Oral QAM AC    pravastatin  40 mg Oral Nightly    enoxaparin  30 mg SubCUTAneous Q24H    sodium chloride flush  5-40 mL IntraVENous 2 times per day    sodium chloride flush  5-40 mL IntraVENous 2 times per day    lipase-protease-amylase  12,000 Units Oral TID WC     sodium chloride flush, sodium chloride, ondansetron **OR** ondansetron, polyethylene glycol, potassium chloride, magnesium sulfate, sodium chloride flush, sodium chloride, acetaminophen **OR** acetaminophen  ADULT DIET; Regular     Lab and other Data:     Recent Labs     10/08/22  1312 10/09/22  1002   WBC 8.9 8.6   HGB 13.7 13.5    254     Recent Labs     10/08/22  1312 10/09/22  1003   * 138   K 3.8 3.3*   CL 96* 101   CO2 22 28   BUN 11 11   CREATININE 0.5 0.6   GLUCOSE 133* 107     Recent Labs     10/08/22  1312   AST 24   ALT 25   BILITOT 0.3   ALKPHOS 63     Troponin T:   Recent Labs     10/08/22  1312   TROPONINI <0.01     Pro-BNP: No results for input(s): BNP in the last 72 hours. INR: No results for input(s): INR in the last 72 hours. UA:  Recent Labs     10/08/22  1421   COLORU YELLOW   PHUR 5.5   CLARITYU Clear   SPECGRAV 1.017   LEUKOCYTESUR Negative   UROBILINOGEN 1.0   BILIRUBINUR Negative   BLOODU Negative   GLUCOSEU Negative     A1C: No results for input(s): LABA1C in the last 72 hours. ABG:No results for input(s): PHART, IVH6IGM, PO2ART, NZR5QWI, BEART, HGBAE, F1EYZWUD, CARBOXHGBART in the last 72 hours.     RAD:   ECHO Complete 2D W Doppler W Color    Result Date: 10/9/2022  Transthoracic Echocardiography Report (TTE)  Demographics   Patient Name Bari Harris Date of Study         10/09/2022   MRN           [id number]          Gender                Female   Date of Birth 1960      Room Number           MHL-0716   Age           64 year(s)   Height:       63 inches       Referring Physician   Vonda Pablo   Weight:       120 pounds      Sonographer           Julianne Rodriguez, [facility]   BSA:          1.56 m^2        Interpreting          Bessie Salazar MD                                Physician   BMI:          21.26 kg/m^2  Procedure Type of Study   TTE procedure:ECHO NO CONTRAST WITH DOP/COLR. Study Location: Echo Lab Technical Quality: Adequate visualization Patient Status: Inpatient HR: 88 bpm BP: 136/81 mmHg Indications:Syncope. Conclusions   Summary  Normal LVEF. No significant valve abnormalities  No pericardial effusion   Signature   ----------------------------------------------------------------  Electronically signed by Bessie Salazar MD(Interpreting  physician) on 10/09/2022 11:28 AM  ----------------------------------------------------------------   Findings   Mitral Valve  Structurally normal mitral valve with normal leaflet mobility. No evidence  of mitral valve stenosis or mild mitral regurgitation. Mitral annular calcification is present. Aortic Valve  Individual aortic valve leaflets are not clearly visualized. NO As   Tricuspid Valve  Tricuspid valve was not well visualized. Trivial tricuspid regurgitation. Cannot estimate RVSP. Pulmonic Valve  The pulmonic valve was not well visualized . Left Atrium  Normal size left atrium. Left Ventricle  E/A flow reversal noted. Suggestive of diastolic dysfunction. Left ventricular ejection fraction is visually estimated at 55-60%. No evidence of left ventricular mass or thrombus noted. Right Atrium  Normal right atrial dimension with no evidence of thrombus or mass noted. Right Ventricle  Normal right ventricular size with preserved RV function.    Pericardial Effusion  No evidence of significant pericardial effusion is noted. Pleural Effusion  No evidence of pleural effusion. Miscellaneous  Aortic root dimension within normal limits. IVC normal.  2D Measurements and Calculations(cm)   LVIDd: 3.34 cm                      LVIDs: 2.38 cm  IVSd: 0.89 cm  LVPWd: 0.74 cm                      AO Root Dimension: 2 cm  Rt. Vent. Dimension: 2.05 cm        LA Dimension: 2.1 cm  % Ejection Fraction: 55 %           LA Area: 6.28 cm^2  LA Volume: 11.4 ml                  LV Systolic dimension: 7.79EK  LA Volume Index: 7 ml/m^2           LV PW diastolic: 7.88QB  LV dimension: 3.34 cm               LVOT diameter: 2.1 cm                                      RV Diastolic dimension: 0.78ST  LVEDV: 58.7 ml                      LVEDVI: 38 ml/m^2  LVESV:25.8 ml                       LVESVI: 17 ml/m^2  Cardic Output (CO): 4.84l/min  Ascending Aorta: 2.9 cm  Doppler Measurements and Calculations   AV Peak Velocity:110 cm/s              MV Peak E-Wave: 63.4 cm/s  AV Mean Velocity:76.5 cm/s             MV Peak A-Wave: 75 cm/s  AV Peak Gradient: 4.84 mmHg            MV E/A Ratio: 0.85 %  AV Mean Gradient: 3 mmHg               MV Mean velocity: NaNcm/s  AV Area (Continuity):2.93 cm^2         MV Peak Gradient: 1.61 mmHg  AV VTI:18.8 cm/s   MV E' septal velocity: 11.6cm/s  MV E' lateral velocity:10.7 cm/s      CT HEAD WO CONTRAST    Result Date: 10/8/2022  NO PRIOR REPORT AVAILABLE Exam: CT OF THE BRAIN WITHOUT CONTRAST Clinical data: Syncope, possible seizure. Technique: Contiguous axial images are obtained from the skull base to vertex without intravenous contrast. Reformatted/MPR images were performed. Radiation dose: CTDIvol =40.0 mGy, DLP =938 mGy x cm. Prior studies: No prior studies submitted. Findings: No acute intracranial abnormality is present. A chronic infarct is in the right periventricular region measuring 7 mm. There are calcifications of the bilateral basal ganglia.   Focal right frontoparietal atrophy. No evidence of acute cortical infarction, hemorrhage, mass or mass effect. No hydrocephalus or abnormal extra-axial fluid collections are present. The posterior fossa is unremarkable. The skull base and calvarium are intact. The included portions of the paranasal sinuses and mastoid air cells are clear. 1. No acute intracranial abnormality is present. A chronic infarct is in the right periventricular region measuring 7 mm. There are calcifications of the bilateral basal ganglia. Focal right frontoparietal atrophy. Recommendation: Follow up as clinically indicated. All CT scans at this facility utilize dose modulation, iterative reconstruction, and/or weight based dosing when appropriate to reduce radiation dose to as low as reasonably achievable. Electronically Signed by Shimon Newman MD at 08-Oct-2022 04:32:36 PM             XR CHEST PORTABLE    Result Date: 10/8/2022  NO PRIOR REPORT AVAILABLE Exam: X-RAY OF [facility] Clinical data:Syncope. Technique:Single view of the chest. Prior studies: No prior studies submitted. Findings: The lungs are grossly clear; noevidence of acute infiltrate or pleural effusion. Cardiac silhouette is within normal limits. No acute osseous abnormality is detected. There is no acute infiltrate or pleural effusion. Recommendation: Follow up as clinically indicated.  Electronically Signed by Sunni Padilla MD, [facility] CERTIFIED at [id number] 03:27:51 PM                       Assessment/Plan   Principal Problem (Resolved):   Syncope and collapse  Active Problems:    Syncope and collapse   No further episodes   Fall precautions   Prolactin-nl   Telemetry    Depression   Vraylar identified as new med   Psych consult       Smoker   noted    GERD (gastroesophageal reflux disease)   Continue PPI    Hyperlipidemia   Cont triglid   Cont Pravachol    Hyponatremia   Resolved   `daily lab    Exocrine pancreatic insufficiency   Continue creon      DVT Prophylaxis: lovenox GI prophylaxis: protonix    Discharge planning: TBD       Further Orders per Clinical course/attending. Electronically signed by BENITA Menezes CNP on 10/9/2022 at 1:18 PM       EMR Dragon/Transcription disclaimer:   Much of this encounter note is an electronic transcription/translation of spoken language to printed text.  The electronic translation of spoken language may permit erroneous, or at times, nonsensical words or phrases to be inadvertently transcribed; although attempts have made to review the note for such errors, some may still exist.

## 2022-10-09 NOTE — PROGRESS NOTES
Pharmacy Adjustment per [address] protocol    Josiane Garcia is a 64 y.o. female. Pharmacy has adjusted medications per [address] protocol. Recent Labs     10/08/22  1312   BUN 11       Recent Labs     10/08/22  1312   CREATININE 0.5       Estimated Creatinine Clearance: 75 mL/min (based on SCr of 0.5 mg/dL).     Height:   Ht Readings from Last 1 Encounters:   10/08/22 5' 3\" (1.6 m)     Weight:  Wt Readings from Last 1 Encounters:   10/08/22 88 lb 6 oz (40.1 kg)         Plan: Adjust the following medications based on [address] protocol:           Changed Lovenox 40mg Daily to 30mg Daily due to patients weight and renal function    Electronically signed by DIANA Ribeiro [facility] on 10/9/2022 at 2:22 AM

## 2022-10-09 NOTE — PROGRESS NOTES
Patient's , son and daughter-in-law are at bedside. They were asked about the pt's current medications. A list was provided but it is not complete and the family is uncertain about which meds the patient is currently taking. On the back of the medication list, a note says \"went out of mind 6th. \" When asked about the note, the daughter-in-law states \"October 6 was one of the days the she went crazy. \"The pt interrupts frequently and she is unable to answer questions about medications. Her story frequently changes. She's currently pacing the room and eating crumbs off her lunch plate.

## 2022-10-09 NOTE — PROGRESS NOTES
Patient has been roaming the hallways and walking into other patients rooms. She is constantly pacing and fidgeting. Very restless. Dr. Johana Nichols has been notified and the pt's  has been called to sit with the pt.

## 2022-10-09 NOTE — PROGRESS NOTES
After a discussion with the patient, she revealed that she has been seeing a man with a sledgehammer for approximately the last 6 months. She stated that he visits her about once or twice a week. She has never told anyone about the man because she thought she might be dreaming. She said he doesn't tell her to do anything but read her bible. She said she has a hard time reading because it's hard to focus. Pt is also concerned that she might have dementia and she often worries about her health. She reports working at the [employer] and wanting to return back to work. She became very tearful when she realized that she wouldn't be going home today.

## 2022-10-10 ENCOUNTER — HOSPITAL ENCOUNTER (INPATIENT)
Age: 62
LOS: 4 days | Discharge: HOME | DRG: 881 | End: 2022-10-14
Attending: PSYCHIATRY & NEUROLOGY | Admitting: PSYCHIATRY & NEUROLOGY
Payer: COMMERCIAL

## 2022-10-10 VITALS
HEIGHT: 63 IN | HEART RATE: 78 BPM | WEIGHT: 92.6 LBS | RESPIRATION RATE: 16 BRPM | DIASTOLIC BLOOD PRESSURE: 75 MMHG | SYSTOLIC BLOOD PRESSURE: 117 MMHG | OXYGEN SATURATION: 100 % | BODY MASS INDEX: 16.41 KG/M2 | TEMPERATURE: 97.9 F

## 2022-10-10 PROBLEM — R44.3 HALLUCINATIONS: Status: ACTIVE | Noted: 2022-10-10

## 2022-10-10 PROBLEM — F32.A DEPRESSION, UNSPECIFIED: Status: ACTIVE | Noted: 2022-10-10

## 2022-10-10 PROBLEM — E43 SEVERE MALNUTRITION (HCC): Status: ACTIVE | Noted: 2022-10-10

## 2022-10-10 PROBLEM — F32.A DEPRESSION, UNSPECIFIED DEPRESSION TYPE: Status: ACTIVE | Noted: 2022-10-10

## 2022-10-10 LAB
ANION GAP SERPL CALCULATED.3IONS-SCNC: 12 MMOL/L (ref 7–19)
BASOPHILS ABSOLUTE: 0.1 K/UL (ref 0–0.2)
BASOPHILS RELATIVE PERCENT: 0.7 % (ref 0–1)
BUN BLDV-MCNC: 10 MG/DL (ref 8–23)
CALCIUM SERPL-MCNC: 9 MG/DL (ref 8.8–10.2)
CHLORIDE BLD-SCNC: 101 MMOL/L (ref 98–111)
CO2: 23 MMOL/L (ref 22–29)
CREAT SERPL-MCNC: 0.4 MG/DL (ref 0.5–0.9)
EKG P AXIS: 77 DEGREES
EKG P-R INTERVAL: 154 MS
EKG Q-T INTERVAL: 320 MS
EKG QRS DURATION: 84 MS
EKG QTC CALCULATION (BAZETT): 414 MS
EKG T AXIS: 47 DEGREES
EOSINOPHILS ABSOLUTE: 0.2 K/UL (ref 0–0.6)
EOSINOPHILS RELATIVE PERCENT: 2.5 % (ref 0–5)
GFR AFRICAN AMERICAN: >59
GFR NON-AFRICAN AMERICAN: >60
GLUCOSE BLD-MCNC: 99 MG/DL (ref 74–109)
HCT VFR BLD CALC: 37.4 % (ref 37–47)
HEMOGLOBIN: 12.6 G/DL (ref 12–16)
IMMATURE GRANULOCYTES #: 0.1 K/UL
LYMPHOCYTES ABSOLUTE: 2.9 K/UL (ref 1.1–4.5)
LYMPHOCYTES RELATIVE PERCENT: 34.3 % (ref 20–40)
MCH RBC QN AUTO: 32.1 PG (ref 27–31)
MCHC RBC AUTO-ENTMCNC: 33.7 G/DL (ref 33–37)
MCV RBC AUTO: 95.4 FL (ref 81–99)
MONOCYTES ABSOLUTE: 0.9 K/UL (ref 0–0.9)
MONOCYTES RELATIVE PERCENT: 10.9 % (ref 0–10)
NEUTROPHILS ABSOLUTE: 4.2 K/UL (ref 1.5–7.5)
NEUTROPHILS RELATIVE PERCENT: 50.9 % (ref 50–65)
PDW BLD-RTO: 12.8 % (ref 11.5–14.5)
PLATELET # BLD: 174 K/UL (ref 130–400)
PLATELET SLIDE REVIEW: ADEQUATE
PMV BLD AUTO: 12.2 FL (ref 9.4–12.3)
POTASSIUM REFLEX MAGNESIUM: 3.6 MMOL/L (ref 3.5–5)
RBC # BLD: 3.92 M/UL (ref 4.2–5.4)
SODIUM BLD-SCNC: 136 MMOL/L (ref 136–145)
WBC # BLD: 8.3 K/UL (ref 4.8–10.8)

## 2022-10-10 PROCEDURE — G0378 HOSPITAL OBSERVATION PER HR: HCPCS

## 2022-10-10 PROCEDURE — 1240000000 HC EMOTIONAL WELLNESS R&B

## 2022-10-10 PROCEDURE — 6370000000 HC RX 637 (ALT 250 FOR IP): Performed by: PSYCHIATRY & NEUROLOGY

## 2022-10-10 PROCEDURE — 99231 SBSQ HOSP IP/OBS SF/LOW 25: CPT | Performed by: PSYCHIATRY & NEUROLOGY

## 2022-10-10 PROCEDURE — 6370000000 HC RX 637 (ALT 250 FOR IP): Performed by: NURSE PRACTITIONER

## 2022-10-10 PROCEDURE — 93010 ELECTROCARDIOGRAM REPORT: CPT | Performed by: INTERNAL MEDICINE

## 2022-10-10 PROCEDURE — 36415 COLL VENOUS BLD VENIPUNCTURE: CPT

## 2022-10-10 PROCEDURE — 85025 COMPLETE CBC W/AUTO DIFF WBC: CPT

## 2022-10-10 PROCEDURE — 80048 BASIC METABOLIC PNL TOTAL CA: CPT

## 2022-10-10 PROCEDURE — 2580000003 HC RX 258: Performed by: NURSE PRACTITIONER

## 2022-10-10 RX ORDER — HYDROXYZINE HYDROCHLORIDE 25 MG/1
25 TABLET, FILM COATED ORAL 3 TIMES DAILY PRN
Status: DISCONTINUED | OUTPATIENT
Start: 2022-10-10 | End: 2022-10-14 | Stop reason: HOSPADM

## 2022-10-10 RX ORDER — PRAVASTATIN SODIUM 20 MG
40 TABLET ORAL NIGHTLY
Status: CANCELLED | OUTPATIENT
Start: 2022-10-10

## 2022-10-10 RX ORDER — PANTOPRAZOLE SODIUM 40 MG/1
40 TABLET, DELAYED RELEASE ORAL DAILY
COMMUNITY

## 2022-10-10 RX ORDER — POLYETHYLENE GLYCOL 3350 17 G/17G
17 POWDER, FOR SOLUTION ORAL DAILY PRN
Status: DISCONTINUED | OUTPATIENT
Start: 2022-10-10 | End: 2022-10-14 | Stop reason: HOSPADM

## 2022-10-10 RX ORDER — LANOLIN ALCOHOL/MO/W.PET/CERES
3 CREAM (GRAM) TOPICAL NIGHTLY
Status: DISCONTINUED | OUTPATIENT
Start: 2022-10-10 | End: 2022-10-14 | Stop reason: HOSPADM

## 2022-10-10 RX ORDER — FENOFIBRATE 160 MG/1
160 TABLET ORAL DAILY
Status: DISCONTINUED | OUTPATIENT
Start: 2022-10-11 | End: 2022-10-14 | Stop reason: HOSPADM

## 2022-10-10 RX ORDER — FENOFIBRATE 160 MG/1
160 TABLET ORAL DAILY
Status: CANCELLED | OUTPATIENT
Start: 2022-10-11

## 2022-10-10 RX ORDER — SUCRALFATE 1 G/1
1 TABLET ORAL 3 TIMES DAILY
COMMUNITY

## 2022-10-10 RX ORDER — PROMETHAZINE HYDROCHLORIDE 25 MG/1
25 TABLET ORAL EVERY 6 HOURS PRN
Status: ON HOLD | COMMUNITY
End: 2022-10-14 | Stop reason: HOSPADM

## 2022-10-10 RX ORDER — PRAVASTATIN SODIUM 20 MG
40 TABLET ORAL NIGHTLY
Status: DISCONTINUED | OUTPATIENT
Start: 2022-10-10 | End: 2022-10-14 | Stop reason: HOSPADM

## 2022-10-10 RX ORDER — MIRTAZAPINE 7.5 MG/1
3.75 TABLET, FILM COATED ORAL NIGHTLY
Status: DISCONTINUED | OUTPATIENT
Start: 2022-10-10 | End: 2022-10-14 | Stop reason: HOSPADM

## 2022-10-10 RX ORDER — NICOTINE 21 MG/24HR
1 PATCH, TRANSDERMAL 24 HOURS TRANSDERMAL DAILY
Status: DISCONTINUED | OUTPATIENT
Start: 2022-10-10 | End: 2022-10-14 | Stop reason: HOSPADM

## 2022-10-10 RX ADMIN — PANCRELIPASE 12000 UNITS: 60000; 12000; 38000 CAPSULE, DELAYED RELEASE PELLETS ORAL at 08:32

## 2022-10-10 RX ADMIN — PRAVASTATIN SODIUM 40 MG: 20 TABLET ORAL at 20:31

## 2022-10-10 RX ADMIN — PANCRELIPASE 12000 UNITS: 60000; 12000; 38000 CAPSULE, DELAYED RELEASE PELLETS ORAL at 16:56

## 2022-10-10 RX ADMIN — HYDROXYZINE HYDROCHLORIDE 25 MG: 25 TABLET, FILM COATED ORAL at 20:31

## 2022-10-10 RX ADMIN — FENOFIBRATE 160 MG: 160 TABLET ORAL at 08:32

## 2022-10-10 RX ADMIN — PANCRELIPASE 12000 UNITS: 60000; 12000; 38000 CAPSULE, DELAYED RELEASE PELLETS ORAL at 12:17

## 2022-10-10 RX ADMIN — PANTOPRAZOLE SODIUM 40 MG: 40 TABLET, DELAYED RELEASE ORAL at 05:01

## 2022-10-10 RX ADMIN — SODIUM CHLORIDE, PRESERVATIVE FREE 10 ML: 5 INJECTION INTRAVENOUS at 08:32

## 2022-10-10 RX ADMIN — Medication 3 MG: at 20:31

## 2022-10-10 RX ADMIN — MIRTAZAPINE 3.75 MG: 7.5 TABLET ORAL at 20:31

## 2022-10-10 ASSESSMENT — SLEEP AND FATIGUE QUESTIONNAIRES
DO YOU HAVE DIFFICULTY SLEEPING: YES
SLEEP PATTERN: DIFFICULTY FALLING ASLEEP;DISTURBED/INTERRUPTED SLEEP;RESTLESSNESS

## 2022-10-10 ASSESSMENT — PATIENT HEALTH QUESTIONNAIRE - PHQ9: SUM OF ALL RESPONSES TO PHQ QUESTIONS 1-9: 26

## 2022-10-10 ASSESSMENT — LIFESTYLE VARIABLES
HOW MANY STANDARD DRINKS CONTAINING ALCOHOL DO YOU HAVE ON A TYPICAL DAY: 3 OR 4
HOW OFTEN DO YOU HAVE A DRINK CONTAINING ALCOHOL: NEVER

## 2022-10-10 NOTE — CONSULTS
[facility]  Psychiatry Consult    Reason for Consult: SI  22-year-old white female with history of depression, pancreatic insufficiency, chronic pain, who presented to the ER status post a syncopal episode. Reportedly, she has been weaning off Xanax and Norco (on them for about 10 yrs, tried to quit cold turkey in June, was taking 1 mg nightly until recetly). Celexa was stopped and she was started on Vraylar by her PCP. She came with hyponatremia which improved. UDS positive for opiate. No evidence of UTI. CT head with no acute changes. Noted old stroke. Patient is observed in her room resting.  is at bedside. Patient is somewhat anxious. Endorses passive suicidal ideation. States she has been depressed and very anxious. Stressed out about the new diagnosis of pancreatic insufficiency. Sleeping poorly. Lost her appetite. Not functioning at home. Reports tearfullness, episodes of agitation.  noticed decline about 2 weeks ago. Patient has been acting out of character, going to the yard at night, pacing the house. Patient complains of confusion and forgetfulness. Feels unsafe to go home at this time. Agrees that she would benefit from inpatient psychiatric treatment. Psychiatric History:    Diagnoses: depression  Suicide attempts/gestures: Denies   Prior hospitalizations: Denies   Medication trials: Celexa, Xanax, Vraylar  Mental health contact: PCP  Head trauma: Denies    Substance Use History:  Drinks 3-4 beers a day. Denies illicits. Recently has been trying to quit smoking. Allergies:  Patient has no known allergies. Medical History:  Past Medical History:   Diagnosis Date    Hyperlipidemia     Osteoarthritis        Family History:  Dementia - sister. Father- dementia, alcoholism. No history of suicide attempts. Social History:  , lives with . No access to guns.     Review of Systems: 14 point review of systems negative except as described above    Vitals:    10/10/22 0500   BP: 117/75   Pulse: 78   Resp: 16   Temp: 97.2 °F (36.2 °C)   SpO2: 97%       Mental Status  Appearance: Appropriately groomed, very thin and in hospital attire. Made good eye contact. Gait not assessed. No abnormal movements or tremor. Behavior: Cooperative  Speech: Normal in tone, volume, and quality. No slurring, dysarthria or pressured speech noted. Mood: \"Not good\"   Affect: Mood congruent. Thought Process: Appears linear  Thought Content: Endorses suicidal and denies homicidal ideation. No overt delusions or paranoia appreciated. Perceptions: Denies auditory or visual hallucinations at present time. Not responding to internal stimuli. Concentration: Intact. Orientation: to person, place, date, and situation. Language: Intact. Fund of information: Intact. Memory: Recent and remote appear intact. Impulsivity: Questionable. Neurovegitative: Poor appetite and sleep. Insight: Impaired. Judgment: Impaired. Assessment  DSM-5 DIAGNOSIS:  Impression   Depression unspecified  Generalized anxiety disorder  Suicidal ideation  Insomnia unspecified  Pancreatic insufficiency  Chronic pain    Patient endorses suicidal ideation. Meeting the criteria for inpatient psychiatric treatment. Please transfer to psychiatry once medically cleared. Thank you for consulting our service. Please call us with questions.       Zana Marrero MD

## 2022-10-10 NOTE — DISCHARGE SUMMARY
[place], [place], [address]    DEPARTMENT OF HOSPITALIST MEDICINE      DISCHARGE SUMMARY:      PATIENT NAMECarie Ojeda  :  1960  MRN:  [id number]    Admission Date:   10/8/2022  1:05 PM Attending: Venus Plummer MD   Discharge Date:   10/10/2022              PCP: No primary care provider on file. Length of Stay: 2 days     Chief Complaint on Admission:   Chief Complaint   Patient presents with    Seizures       Consultants:     IP CONSULT TO PSYCHIATRY  IP CONSULT TO PSYCHIATRY       Discharge Problem List:   Principal Problem (Resolved):   Syncope   No further episodes  Active Problems:    Syncope and collapse    Depression   Ongoing   New to Vraylar    Smoker   currrent    GERD (gastroesophageal reflux disease)   Cont PPI    Hyperlipidemia   Cont statin    Hyponatremia   resolved    Exocrine pancreatic insufficiency   Continue creon    Hallucinations   Psych following         Last dated Assessment and Plan . .. 10/10/2022      CUMULATIVE  HOSPITAL  COURSE  AND  TREATMENT:  Elkin Owens is an 64 y.o. female past medical history of chronic low back pain, anxiety, depression, syncope and collapse today, EPI on Creon replacement. Patient presents to the emergency room via EMS.  reports she came up behind him was talking gibberish and he turned around she was passing out and starting to fall he caught her and they both went to the floor. He called EMS and she woke up in the back of an ambulance and did not have any recollection of why she was there. Patient gives a recent history of 10 pound weight loss in a 2-week timeframe, she was seen by her PCP earlier this week and wanting to come off Norco and Xanax and he gave her an unknown sample to help with any kind of withdrawal symptoms per her . .  She stopped her Celexa and started this new medication as she cannot remember the name. Has been well message her when he goes home. Patient is tachycardic 100-130 in the ED.  it is sinus. She states her heart rate runs in the 100's. Laboratory eval sodium 129 potassium 3.8 glucose 133 troponin 0.01 liver panel normal TSH 4.54 Free T4 1.87 drug screen positive for opiates White count 8.9, hemoglobin 13.7 hematocrit 39.6. Urine negative. On exam patient's awake alert and oriented. Denies any chest pain shortness of breath weakness headache or extreme fatigue  . Patient admitted to hospital service. Because of the concern in the ED for suicidal ideation a psych consult was called. Nursing reports Pt restless and refusing to stay in her room. She is agitated and talking rapidly and changing thoughts rapidly. Family notified to come sit with pt. Family brought sample box of Johnny Reese that she was given by PCP earlier in the week. Sodium normalized. TSH nl. She has been evaluated by psych and will dc to [facility] today. Her labs have normalized and no further syncopal episodes. OBJECTIVE:  /75   Pulse 78   Temp 97.9 °F (36.6 °C) (Temporal)   Resp 16   Ht 5' 3\" (1.6 m)   Wt 92 lb 9.6 oz (42 kg)   SpO2 100%   BMI 16.40 kg/m²       Heart: RRR   Lungs: Bilateral fair air entry   Abdomen: Soft, non-tender   Extremities: No edema   Neurologic: Alert and oriented   Skin: Warm and dry          Laboratory Data:  Recent Labs     10/08/22  1312 10/09/22  1002 10/10/22  0202   WBC 8.9 8.6 8.3   HGB 13.7 13.5 12.6    254 174     Recent Labs     10/08/22  1312 10/09/22  1003 10/10/22  0202   * 138 136   K 3.8 3.3* 3.6   CL 96* 101 101   CO2 22 28 23   BUN 11 11 10   CREATININE 0.5 0.6 0.4*   GLUCOSE 133* 107 99     Recent Labs     10/08/22  1312   AST 24   ALT 25   BILITOT 0.3   ALKPHOS 63     Troponin T:   Recent Labs     10/08/22  1312   TROPONINI <0.01     Pro-BNP: No results for input(s): BNP in the last 72 hours. INR: No results for input(s): INR in the last 72 hours.   UA:  Recent Labs     10/08/22  1421   COLORU YELLOW   PHUR 5.5   CLARITYU Clear   SPECGRAV 1.017 LEUKOCYTESUR Negative   UROBILINOGEN 1.0   BILIRUBINUR Negative   BLOODU Negative   GLUCOSEU Negative     A1C: No results for input(s): LABA1C in the last 72 hours. ABG:No results for input(s): PHART, VRB0OUW, PO2ART, VFY8JLQ, BEART, HGBAE, X1OJZFBU, CARBOXHGBART in the last 72 hours. Impressions of imaging performed in 48 hours before discharge:    CT HEAD WO CONTRAST    Result Date: 10/8/2022  1. No acute intracranial abnormality is present. A chronic infarct is in the right periventricular region measuring 7 mm. There are calcifications of the bilateral basal ganglia. Focal right frontoparietal atrophy. Recommendation: Follow up as clinically indicated. All CT scans at this facility utilize dose modulation, iterative reconstruction, and/or weight based dosing when appropriate to reduce radiation dose to as low as reasonably achievable. Electronically Signed by Shimon Newman MD at 08-Oct-2022 04:32:36 PM             XR CHEST PORTABLE    Result Date: 10/8/2022  There is no acute infiltrate or pleural effusion. Recommendation: Follow up as clinically indicated.  Electronically Signed by Sunni Padilla MD, MQSA CERTIFIED at [phone] 03:27:51 PM                   Medication List        ASK your doctor about these medications      ALPRAZolam 1 MG tablet  Commonly known as: XANAX     cariprazine hcl 1.5 MG capsule  Commonly known as: VRAYLAR     citalopram 20 MG tablet  Commonly known as: CELEXA     dicyclomine 10 MG capsule  Commonly known as: BENTYL     fenofibrate 160 MG tablet  Commonly known as: TRIGLIDE     HYDROcodone-acetaminophen 7.5-325 MG per tablet  Commonly known as: NORCO     omeprazole 20 MG delayed release capsule  Commonly known as: PRILOSEC     ondansetron 4 MG disintegrating tablet  Commonly known as: Zofran ODT  Take 1 tablet by mouth every 8 hours as needed for Nausea or Vomiting     pravastatin 40 MG tablet  Commonly known as: PRAVACHOL                ISSUES TO BE ADDRESSED AT HOSPITAL FOLLOW-UP VISIT:    Advised patient to follow-up closely with PCP upon discharge for management of chronic medical issues  Please see the detailed discharge directions delivered from earlier today! Condition on Discharge: stable  Discharge Disposition: Inpatient Psych    Recommended Follow Up:  No follow-up provider specified. Followup Appointments Scheduled at Time of Discharge:  No future appointments. Discharge Instructions:   Please see the discharge paperwork. Patient was seen at bedside today, and the examination shows improvement since yesterday. Detailed discharge directions delivered to the patient by myself and our nursing staff, who verbalizes understanding and is very happy and satisfied with the plan. Patient has been advised to continue all medications as prescribed and advised, and f/u with PCP within 1 week. Patient is stable from medical standpoint to be discharged. Total time spent during patient evaluation and assessment, discussion with the nurse/family, addressing discharge medications/scripts and coordination of care for safe discharge was in excess of 35 minutes.       Signed Electronically:    BENITA Ha CNP  11:40 AM 10/10/2022

## 2022-10-10 NOTE — PROGRESS NOTES
Behavioral Services  Medicare Certification Upon Admission    I certify that this patient's inpatient psychiatric hospital admission is medically necessary for:    [x] (1) Treatment which could reasonably be expected to improve this patient's condition,       [x] (2) Or for diagnostic study;     AND     [x](2) The inpatient psychiatric services are provided while the individual is under the care of a physician and are included in the individualized plan of care.     Estimated length of stay/service 3-5 days    Plan for post-hospital care TBA    Electronically signed by Marco A Aburto MD on 10/10/2022 at 1:52 PM

## 2022-10-10 NOTE — PROGRESS NOTES
Comprehensive Nutrition Assessment    Type and Reason for Visit:  Initial, Positive Nutrition Screen    Nutrition Recommendations/Plan:   Start ONS     Malnutrition Assessment:  Malnutrition Status:  Severe malnutrition (10/10/22 1304)    Context:  Chronic Illness     Findings of the 6 clinical characteristics of malnutrition:  Energy Intake:  No significant decrease in energy intake  Weight Loss:  Greater than 10% over 6 months     Body Fat Loss:  Severe body fat loss Triceps, Orbital   Muscle Mass Loss:  Severe muscle mass loss Temples (temporalis), Clavicles (pectoralis & deltoids), Hand (interosseous)  Fluid Accumulation:  No significant fluid accumulation Extremities   Strength:  Not Performed    Nutrition Assessment:    +NS for decreased po and weight. Pt is receiving a Regular diet with po intake %. Weight has decreased 21# ot 18.6% UBW. Will try ONS    Nutrition Related Findings:    thin Wound Type: None       Current Nutrition Intake & Therapies:    Average Meal Intake: %  Average Supplements Intake: None Ordered  ADULT DIET; Regular    Anthropometric Measures:  Height: 5' 3\" (160 cm)  Ideal Body Weight (IBW): 115 lbs (52 kg)    Admission Body Weight: 88 lb (39.9 kg) (standing)  Current Body Weight: 92 lb 9.6 oz (42 kg), 80.5 % IBW.     Current BMI (kg/m2): 16.4  Usual Body Weight: 114 lb (51.7 kg) (4/2022)  % Weight Change (Calculated): -18.8    BMI Categories: Underweight (BMI less than 18.5)    Estimated Daily Nutrient Needs:  Energy Requirements Based On: Kcal/kg  Weight Used for Energy Requirements: Current  Energy (kcal/day): 8346-4210 kcals (30-35 kcals/kg)  Weight Used for Protein Requirements: Current  Protein (g/day): 63  Method Used for Fluid Requirements: 1 ml/kcal  Fluid (ml/day): 8301-9601 ml    Nutrition Diagnosis:   Unintended weight loss related to acute injury/trauma, psychological cause or life stress as evidenced by BMI, weight loss, weight loss greater than or equal to 10% in 6 months    Nutrition Interventions:   Food and/or Nutrient Delivery: Continue Current Diet, Start Oral Nutrition Supplement  Nutrition Education/Counseling: No recommendation at this time  Coordination of Nutrition Care: Continue to monitor while inpatient       Goals:     Goals: Meet at least 75% of estimated needs, PO intake 50% or greater       Nutrition Monitoring and Evaluation:   Behavioral-Environmental Outcomes: None Identified  Food/Nutrient Intake Outcomes: Food and Nutrient Intake, Supplement Intake  Physical Signs/Symptoms Outcomes: Biochemical Data, Fluid Status or Edema, Weight, Skin    Discharge Planning:    Continue current diet     Juan Figueroa, MS, RD, LD  Contact: [phone]

## 2022-10-10 NOTE — PLAN OF CARE
Nutrition Problem #1: Unintended weight loss  Intervention: Food and/or Nutrient Delivery: Continue Current Diet, Start Oral Nutrition Supplement  Nutritional

## 2022-10-10 NOTE — PROCEDURES
[facility] Admission Note  Nursing Admission Note        Reason for Admission: Pt is a 64 yoa,  female who was brought to ER with syncopal episode and admitted to medical, psych consult placed. Pt endored to Psych that she is having passive SI, no plan. Pt has visual hallucinations where she sees a man with a sledge hammer a few times a week and he informs her o read her bible. Pt has had recent psychotropic med changes by her PCP. Patient also received a new medical diagnosis that has caused her stress, as well. Denies past suicide attempts, no previous inpatient admissions or outpatient therapy.     Patient Active Problem List   Diagnosis    Syncope and collapse    Depression    Smoker    GERD (gastroesophageal reflux disease)    Hyperlipidemia    Hyponatremia    Exocrine pancreatic insufficiency    Hallucinations    Severe malnutrition (HCC)    Depression, unspecified    Depression, unspecified depression type         Addictive Behavior:   Addictive Behavior  In the Past 3 Months, Have You Felt or Has Someone Told You That You Have a Problem With  : None    Medical Problems:   Past Medical History:   Diagnosis Date    Hyperlipidemia     Osteoarthritis     Psychiatric problem        Status EXAM:  Mental Status and Behavioral Exam  Normal: No  Level of Assistance: Independent/Self  Facial Expression: Flat, Sad  Affect: Congruent  Level of Consciousness: Alert  Frequency of Checks: 4 times per hour, close  Mood:Normal: No  Mood: Depressed, Anxious, Sad  Motor Activity:Normal: No  Motor Activity: Increased  Eye Contact: Fair  Observed Behavior: Preoccupied  Sexual Misconduct History: Current - no  Preception: [place] to person, [place] to time, [place] to place, [place] to situation  Attention:Normal: No  Attention: Distractible  Thought Processes: Circumstantial  Thought Content:Normal: No  Thought Content: Preoccupations  Depression Symptoms: Impaired concentration  Anxiety Symptoms: Generalized  Celeste Symptoms: Flight of ideas  Hallucinations: Visual (comment)  Delusions: No  Memory:Normal: Yes  Insight and Judgment: No  Insight and Judgment: Poor judgment, Poor insight    Psych:   Suicidal Ideation: yes. If yes, is there a plan? (Describe) Passive, no plan   Homicidal Ideation:  no.  If yes, describe: No   Auditory/Visual Hallucinations:  yes. If yes, describe AVH? Sees a man with a sledge hammer approximately twice a week at night. Tells her to read her bible    Metabolic Screening:    No results found for: LABA1C  Lab Results   Component Value Date    CHOL 183 05/04/2017     Lab Results   Component Value Date    TRIG 152 05/04/2017     Lab Results   Component Value Date    HDL 60 (L) 05/04/2017     No components found for: LDLCAL  No results found for: LABVLDL    Body mass index is 16.9 kg/m². BP Readings from Last 2 Encounters:   10/10/22 (!) 151/76   10/10/22 117/75       PATIENT STRENGTHS and Barriers:   Patient Strengths/Barriers  Strengths (Must Choose Two): Independent living, Support from family, Stable domestic situation  Barriers: Recreational/leisure/hobbies, Technical/vocation      Tobacco Screening:  Practical Counseling, on admission, maliha X, if applicable and completed (first 3 are required if patient doesn't refuse):            Recognizing danger situations (included triggers and roadblocks)   Yes              Coping skills (new ways to manage stress, exercise, relaxation techniques, changing routine, distraction  Yes                                                    Basic information about quitting (benefits of quitting, techniques in how to quit, available resources Yes  Referral for counseling faxed to [facility]     No                                       Patient refused counseling Yes  Patient has not smoked in the last 30 days No  Patient offered nicotine patch.   Received Yes  Refused No  Patient is a non-smoker No       Admission to Unit:    Pt admitted to [facility] under the care of Dr. Flynn Scheuermann,  arrived on unit via [place] campus with security and staff from 7th floor, PCU. Patient arrived dressed in paper scrubs:  yes. Body assessment and safety check completed by Marilee Hu RN and Marguerite Jordan RN and  no contraband discovered. Patient belongings and valuables was cataloged and accounted for by Archana Ford and Vannessa Kearney. Pt had no items to go into safe. Admission completed by Marguerite Arteaga  Oriented to unit, unit policy and expectations:  yes    Reviewed and explained all legal documents:  yes    Education for Fall Prevention and Restraints given: yes    Patient signed all legal documents yes   Pt verbalizes understanding:yes     Judy Thomas Obtained? yes    Medical Bed:  Does patient require a medical bed? No  If answered yes for medical bed use, does the patient have the following conditions? High risk for falls? yes   Obstructive sleep apnea? no   Oxygen Use? no   Use of assistive devices? no   Other, (explain)? Identifies stressors. yes   New medical diagnosis, feeling bad about herself, hopeless, worthless, loss of interest.      Protective Factors:  Patient identifies protective factors with nursing staff as follows: Identifies reasons for living: Yes   Supportive Social Network or family: Yes    Belief that suicide is immoral/high spirituality: Yes   Responsibility to family or others/living with family: Yes   Fear of death or dying due to pain and suffering: Yes   Engaged in work or school: No, recently lost job a few months ago for not coming to work     If Patient is unable to identify, reason why? COVID TEACHING:   Nursing provided education regarding COVID for social distancing, wearing masks, washing hands, and reporting any symptoms: yes  Mask Provided: yes If patient refused, reason:       Admission Note:    ALOx4 upon arrival, does not follow directions well, easily confused. Pt has been shown multiple times on working phone but cannot grasp concept. Wrote step-by-step directions down for patient to assist.  Pt unplugged phone and was walking around in day area with the phone to practice. She was trying to get in to the nurse station door then walked around unit and came back the door. She advised me she was ready to come back to me. Pt had followed me in to the nurses station when I came in earlier today. I advised her she wasn't allowed back here with me but I can help her from across the desk. Pt has been walking around unit \"smoking\" the patient pen. I did give her a nicotine patch earlier in the shift. At dinner time I went to call patient from to the TV area to eat. Pt was getting down on her knees in front of her seat (looked like she was about to pray). She got up and ate dinner. Pt has had no outburst but did get frustrated over the phone. Pt is continent of bladder and bowel, ambulates independently, feeds self without assistance. Reports passive SI, feeling worthless and she'd feel better off dead. No plan, denies HI, sees a man with a sledge hammer, denies auditory hallucinations. Will continue to monitor for safety and behaviors.           Electronically signed by Little Godinez RN on 10/10/22 at 6:27 PM CDT

## 2022-10-11 PROBLEM — E43 SEVERE MALNUTRITION (HCC): Chronic | Status: ACTIVE | Noted: 2022-10-10

## 2022-10-11 LAB
CHOLESTEROL, TOTAL: 151 MG/DL (ref 160–199)
HBA1C MFR BLD: 5.7 % (ref 4–6)
HDLC SERPL-MCNC: 62 MG/DL (ref 65–121)
LDL CHOLESTEROL CALCULATED: 70 MG/DL
TRIGL SERPL-MCNC: 96 MG/DL (ref 0–149)
TSH REFLEX FT4: 3.26 UIU/ML (ref 0.35–5.5)
VITAMIN B-12: 511 PG/ML (ref 211–946)
VITAMIN D 25-HYDROXY: 39.8 NG/ML

## 2022-10-11 PROCEDURE — 90792 PSYCH DIAG EVAL W/MED SRVCS: CPT | Performed by: PSYCHIATRY & NEUROLOGY

## 2022-10-11 PROCEDURE — 1240000000 HC EMOTIONAL WELLNESS R&B

## 2022-10-11 PROCEDURE — 6370000000 HC RX 637 (ALT 250 FOR IP): Performed by: PSYCHIATRY & NEUROLOGY

## 2022-10-11 PROCEDURE — 84443 ASSAY THYROID STIM HORMONE: CPT

## 2022-10-11 PROCEDURE — 80061 LIPID PANEL: CPT

## 2022-10-11 PROCEDURE — 6370000000 HC RX 637 (ALT 250 FOR IP): Performed by: NURSE PRACTITIONER

## 2022-10-11 PROCEDURE — 36415 COLL VENOUS BLD VENIPUNCTURE: CPT

## 2022-10-11 PROCEDURE — 82607 VITAMIN B-12: CPT

## 2022-10-11 PROCEDURE — 83036 HEMOGLOBIN GLYCOSYLATED A1C: CPT

## 2022-10-11 PROCEDURE — 82306 VITAMIN D 25 HYDROXY: CPT

## 2022-10-11 RX ORDER — RISPERIDONE 1 MG/1
1 TABLET, ORALLY DISINTEGRATING ORAL EVERY 6 HOURS PRN
Status: DISCONTINUED | OUTPATIENT
Start: 2022-10-11 | End: 2022-10-14 | Stop reason: HOSPADM

## 2022-10-11 RX ORDER — GABAPENTIN 300 MG/1
300 CAPSULE ORAL 3 TIMES DAILY
Status: DISCONTINUED | OUTPATIENT
Start: 2022-10-11 | End: 2022-10-12

## 2022-10-11 RX ADMIN — HYDROXYZINE HYDROCHLORIDE 25 MG: 25 TABLET, FILM COATED ORAL at 20:46

## 2022-10-11 RX ADMIN — PANCRELIPASE 12000 UNITS: 60000; 12000; 38000 CAPSULE, DELAYED RELEASE PELLETS ORAL at 11:39

## 2022-10-11 RX ADMIN — GABAPENTIN 300 MG: 300 CAPSULE ORAL at 13:21

## 2022-10-11 RX ADMIN — PANCRELIPASE 12000 UNITS: 60000; 12000; 38000 CAPSULE, DELAYED RELEASE PELLETS ORAL at 08:05

## 2022-10-11 RX ADMIN — PRAVASTATIN SODIUM 40 MG: 20 TABLET ORAL at 20:46

## 2022-10-11 RX ADMIN — MIRTAZAPINE 3.75 MG: 7.5 TABLET ORAL at 20:46

## 2022-10-11 RX ADMIN — PANCRELIPASE 12000 UNITS: 60000; 12000; 38000 CAPSULE, DELAYED RELEASE PELLETS ORAL at 16:52

## 2022-10-11 RX ADMIN — Medication 3 MG: at 20:46

## 2022-10-11 RX ADMIN — FENOFIBRATE 160 MG: 160 TABLET ORAL at 08:05

## 2022-10-11 RX ADMIN — GABAPENTIN 300 MG: 300 CAPSULE ORAL at 20:46

## 2022-10-11 NOTE — PROGRESS NOTES
SW met with patient to complete psychosocial and lifetime CSSR-S on this date. Patients long and short-term goals discussed. Patient voiced understanding. Treatment plan sheet signed. Patient verbalized understanding of the treatment plan. Patient participated in goals and objectives of the treatment plan. Patient discussed safety plan with . SW explained treatment goals with pt. Decreasing depression and anxiety by improvement of positive coping patterns was discussed. Pt acknowledged understanding of treatment goals and signed treatment plan signature sheet. In the last 6 months has the patient been a danger to self: Yes  In the last 6 months has the patient been a danger to others: No  Legal Guardian/POA: No    Provided patient with [employer] Online handout entitled \"Quitting Smoking. \"  Reviewed handout with patient: addressing dangers of smoking, developing coping skills, and providing basic information about quitting. Patient received all components practical counseling of tobacco practical counseling during the hospital stay.

## 2022-10-11 NOTE — PROGRESS NOTES
Collateral obtained from: patients  Brendon Sawyer [phone]    Immediate Stressors & Time Episode Began: patient was having a spell and she had was speaking very loudly and her eyes were rolling around in her head. Patient was not herself. Patient was acting kind of acting crazy. Patients  didn't know what to do. Patient sees her doctor for medication. Patient was trouble digesting her food. Patient would not sleeping no more than a hour or two at night. Patient has been taking about harming herself for the last two weeks. Diagnosis/Hx of compliance with meds: taking medication as directed    Tx Hx/Past hospitalizations:  caller reports no cynthia treatment history    Family hx of psychiatric issues: caller reports no family history of psychiatric issues patient has a sister that was diagnosed with dementia. Substance Abuse: caller reports no substance abuse history    Pending Legal: caller reports no pending legal issues    Safety Issues (Weapons? Hx of attempts): The importance of locking weapons in a secured location was explained and recommended to collateral caller. Weapons are the house but she doesn't have access. Support system/Medication Managed by: The importance of medication management and locking extra medication in a secured location was explained and reccommended to collateral caller.      Discharge Disposition: home -lives with family    Additional Info:

## 2022-10-11 NOTE — PROGRESS NOTES
Admission Note      Reason for admission/Target Symptom: Per nursing admission assessment - Reason for Admission: Pt is a 64 yoa,  female who was brought to ER with syncopal episode and admitted to medical, psych consult placed. Pt endored to Psych that she is having passive SI, no plan. Pt has visual hallucinations where she sees a man with a sledge hammer a few times a week and he informs her o read her bible. Pt has had recent psychotropic med changes by her PCP. Patient also received a new medical diagnosis that has caused her stress, as well. Denies past suicide attempts, no previous inpatient admissions or outpatient therapy. Diagnoses: Depression NOS  UDS: Opiate  BAL:  Neg    SW will meet with treatment team to discuss patient's treatment including care planning, discharge planning, and follow-up needs. Patient [facility] Treatment team will identify the patient's discharge needs. Appointments will be made for medication management and outpatient therapy/counseling. Pt confirmed the need for ongoing treatment post inpatient stay. Pt was also provided a handout of contact information for drug and alcohol treatment centers and other community support service such as [facility], AA, and [facility].

## 2022-10-11 NOTE — PROGRESS NOTES
WRAP UP GROUP NOTE:     Patient's Goal:  Providing feedback as to their own progress in the care-plan provided. Pt's have an opportunity to explore self-reflective skills and share any additional cares and concerns not yet addressed. Pt effectively participated.      Energy level: low and hyper  Appetite: improving  Concentration: poor  Hallucinations: gone  Depression: worse  Anxiety: constant  How I worked today: did not try  What helps me sleep: read  Any questions/complaints/comments: no    Groups/activities that helped me today were:  Seeing doctors    Electronically signed by Juanita Ontiveros on 10/10/2022 at 7:32 PM

## 2022-10-11 NOTE — PROGRESS NOTES
Treatment Team Note:    Target Symptoms/Reason for admission: Per nursing admission assessment - Reason for Admission: Pt is a 64 yoa,  female who was brought to ER with syncopal episode and admitted to medical, psych consult placed. Pt endored to Psych that she is having passive SI, no plan. Pt has visual hallucinations where she sees a man with a sledge hammer a few times a week and he informs her o read her bible. Pt has had recent psychotropic med changes by her PCP. Patient also received a new medical diagnosis that has caused her stress, as well. Denies past suicide attempts, no previous inpatient admissions or outpatient therapy. Diagnoses per psych provider: Depression, unspecified [R92. A]  Depression, unspecified depression type [F32. A]    Therapist met with treatment team to discuss patients treatment and discharge plans. Patient's aftercare plan is: SW will meet with patient to gather information    Aftercare appointments made: No - SW will make discharge appointments    Pt lives with: spouse    Collateral obtained from: SW will meet with patient to gather information  Collateral obtained on:new admissions    Attending groups: Yes    Behavior: calm Patient alert and oriented to person and place. Patient is confused at times and requires reoriented to time and situation. Patient requires step by step instruction on how to complete tasks such as making phone calls. Patient has mostly isolated to her room this am but will come out for meals and has been attempting to color at times. Patient is cooperative with staff. Will continue to monitor.      Has patient been completing ADL's:  New admission - unknown at this time - SW will monitor    SI:  patient denies SI  Plan: no   If yes describe: N/A - patient denies plan  HI:  patient denies HI  If present describe: N/A  Delusions: patient denies delusions  If present describe: N/A  Hallucinations: patient denies hallucinations  If present

## 2022-10-11 NOTE — PLAN OF CARE
Problem: Nutrition Deficit:  Goal: Optimize nutritional status  Outcome: Progressing  Flowsheets (Taken 10/11/2022 9727)  Nutrient intake appropriate for improving, restoring, or maintaining nutritional needs: Recommend appropriate diets, oral nutritional supplements, and vitamin/mineral supplements

## 2022-10-11 NOTE — PROGRESS NOTES
Group Therapy Note    Date:   Start Time: 0730  End Time:  0800  Number of Participants: 3    Type of Group: Healthy Living/Wellness    Wellness Binder Information  Module Name:  patient self inventory     Patient's Goal:  getting it together    Notes:      Status After Intervention:  Unchanged    Participation Level:  Active Listener    Participation Quality: Appropriate      Speech:  normal      Thought Process/Content: Linear      Affective Functioning: Flat      Mood: anxious and depressed      Level of consciousness:  Alert and Oriented x4      Response to Learning: Able to change behavior      Endings: None Reported    Modes of Intervention: Education and Support      Discipline Responsible: Registered Nurse      Signature:  Vaishnavi Nicholson RN

## 2022-10-11 NOTE — PROGRESS NOTES
I Daily Shift Assessment-Geriatric Unit  Nursing Progress Note          Room: [address] [place] [address]   Name: Remi Phillips   Age: 64 y.o. Gender: female   Dx: Depression, unspecified  Precautions: suicide risk  Inpatient Status: voluntary     SLEEP:    Sleep: Yes,   Sleep Quality Good   Hours Slept: 8   Sleep Medications: Yes  PRN Sleep Meds: No       MEDICAL:      Other PRN Meds: No   Med Compliant: Yes   Accu-Chek: No   Oxygen/CPAP/BiPAP: No  CIWA/CINA: No   PAIN Assessment: none  Side Effects from medication: No    COVID TEACHING:    Is Patient experiencing any respiratory symptoms (headache, fever, body aches, cough. Nelsy Sousa ): no  Patient educated by nursing to practice social distancing, wear masks, wash hands frequently: yes    Medical Bed:   Is patient in a medical bed? yes   If medical bed is in use, has nursing secured room while patient is awake and out of the room? yes  Has safety checks by nursing been completed on the bed/room this shift? yes    Protective Factors:    Patient identifies protective factors with nursing staff as follows: Identifies reasons for living: Yes   Supportive Social Network or family: Yes    Belief that suicide is immoral/high spirituality: Yes   Responsibility to family or others/living with family: Yes   Fear of death or dying due to pain and suffering: Yes   Engaged in work or school: No     If Patient is unable to identify, reason why? Metabolic Screening:    Lab Results   Component Value Date    LABA1C 5.7 10/11/2022       Lab Results   Component Value Date    CHOL 151 (L) 10/11/2022    CHOL 183 05/04/2017     Lab Results   Component Value Date    TRIG 96 10/11/2022    TRIG 152 05/04/2017     Lab Results   Component Value Date    HDL 62 (L) 10/11/2022    HDL 60 (L) 05/04/2017     No components found for: LDLCAL  No results found for: LABVLDL      Body mass index is 16.9 kg/m².     BP Readings from Last 2 Encounters:   10/11/22 124/73   10/10/22 117/75         PSYCH:     SI denies suicidal ideation    HI Negative for homicidal ideation        AVH:no If Hallucinations are present, describe? Denies       Depression: 8 Anxiety: 5       GENERAL:      Appetite: good  Percent Meals: 75%   Social: No Speech: normal   Appearance:appropriately dressed and healthy looking  Assistive Devices: noneLevel of Assist: Independent      GROUP:    Group Participation: Yes  Participation LevelActive Listener and Minimal    Participation QualityAppropriate and Sharing    Notes: Patient alert and oriented to person and place. Patient is confused at times and requires reoriented to time and situation. Patient requires step by step instruction on how to complete tasks such as making phone calls. Patient has mostly isolated to her room this am but will come out for meals and has been attempting to color at times. Patient is cooperative with staff. Will continue to monitor.            Electronically signed by Gifty Gold RN on 10/11/22 at 9:12 AM CDT

## 2022-10-11 NOTE — PROGRESS NOTES
Nutrition Assessment     Type and Reason for Visit: Reassess    Nutrition Recommendations/Plan:   Start ONS TID. Malnutrition Assessment:  Malnutrition Status: Severe malnutrition    Nutrition Assessment:  Pt followed for positive nutrition screen during hospital stay. New positive nutrition screen with admit to behavioral health unit. Pt has had 22# (18.9%) wt loss over the past 6 months per records. PO intake has been adequate so far, avg >75%. Will restart ONS and follow nutrition progression. Estimated Daily Nutrient Needs:  Energy (kcal):  8625-4701 Weight Used for Energy Requirements: Current     Protein (g):  63 Weight Used for Protein Requirements: Current        Fluid (ml/day):  3221-9453 Method Used for Fluid Requirements: 1 ml/kcal    Nutrition Related Findings:          Current Nutrition Therapies:    ADULT DIET;  Regular; Safety Tray; Safety Tray (Disposables)  ADULT ORAL NUTRITION SUPPLEMENT; Breakfast, Lunch, Dinner; Standard High Calorie/High Protein Oral Supplement    Anthropometric Measures:  Height: 5' 2\" (157.5 cm)  Current Body Wt: 92 lb 6.4 oz (41.9 kg)   BMI: 16.9    Nutrition Diagnosis:   Severe malnutrition, In context of chronic illness related to psychological cause or life stress as evidenced by weight loss greater than or equal to 10% in 6 months, severe loss of subcutaneous fat, severe muscle loss    Nutrition Interventions:   Food and/or Nutrient Delivery: Continue Current Diet, Start Oral Nutrition Supplement  Nutrition Education/Counseling: No recommendation at this time  Coordination of Nutrition Care: Continue to monitor while inpatient       Goals:  Previous Goal Met: Progressing toward Goal(s)  Goals: PO intake 75% or greater       Nutrition Monitoring and Evaluation:   Behavioral-Environmental Outcomes: None Identified  Food/Nutrient Intake Outcomes: Food and Nutrient Intake, Supplement Intake  Physical Signs/Symptoms Outcomes: Biochemical Data, Nutrition Focused Physical Findings, Weight    Discharge Planning:    Continue current diet     Kath Bullock RD, LD  Contact: [phone]

## 2022-10-11 NOTE — H&P
Department of Psychiatry  Attending History and Physical        CHIEF COMPLAINT:  \"I'm still going through withdrawal\"    History obtained from: patient, chart    HISTORY OF PRESENT ILLNESS:    70-year-old white female with history of depression, pancreatic insufficiency, chronic pain, who presented to the ER status post a syncopal episode. Reportedly, she has been weaning off Xanax and Norco (on them for about 10 yrs, tried to quit cold turkey in June, was taking 1 mg nightly until recetly). Per [facility], Xanax last filled on 9/12/22. Celexa was stopped and she was started on Vraylar by her PCP. She came with hyponatremia which improved. UDS positive for opiate. No evidence of UTI. CT head with no acute changes. Noted old stroke. Patient was transferred to psychiatry yesterday due to [place] 1. Patient stated she has been depressed and very anxious. Stressed out about the new diagnosis of pancreatic insufficiency. Sleeping poorly. Lost her appetite. Not functioning at home. Reports tearfullness, episodes of agitation.  noticed decline about 2 weeks ago. Patient has been acting out of character, going to the yard at night, pacing the house. Patient complained of confusion and forgetfulness. Some confusion noted on the unit last night. Patient is calm and cooperative when seen this morning. She is resting in bed. States she slept well. Ate   Has not noticed any improvement in her mood. No suicidal thoughts at this time. Worried she is going through withdrawal.  We processed her feelings. Supportive therapy provided. Psychiatric History:    Diagnoses: depression  Suicide attempts/gestures: Denies   Prior hospitalizations: Denies   Medication trials: Celexa, Xanax, Vraylar  Mental health contact: PCP  Head trauma: Denies     Substance Use History:  Drinks 3-4 beers a day. Denies illicits. Recently has been trying to quit smoking.     Past Medical History:    Past Medical History:   Diagnosis alcoholism. No history of suicide attempts. REVIEW OF SYSTEMS:  General: No fevers, chills, night sweats, no recent weight loss or gain. Head: No headache, no migraine. Eyes: No recent visual changes. Ears: No recent hearing changes. Nose: No increased congestion or change in sense of smell. Throat: No sore throat, no trouble swallowing. Cardiovascular: No chest pain or palpitations, or dizziness. Respiratory: No cough, wheezes, congestion, or shortness of breath. Gastrointestinal: No abdominal pain, nausea or vomiting, no diarrhea or constipation. Musculo-skeletal: No edema, deformities, or loss of functions. Neurological: No loss of consciousness, abnormal sensations, or weakness. Skin: No rash, abrasions or bruises. PHYSICAL EXAM:  GENERAL APPEARANCE: 64y.o. year-old female in NAD   HEAD: Normocephalic, atraumatic. THROAT: No erythema, exudates, lesions. No tongue laceration. CARDIOVASCULAR: PMI nondisplaced. Regular rhythm and rate. Normal S1 and S2.  PULMONARY: Clear to auscultation bilaterally, no tenderness to palpation. ABDOMEN: Soft, nontender, nondistended. MUSCULOSKELTAL: No obvious deformities, clubbing, cyanosis or edema, no spinous process or paraspinous tenderness, normal ROM, distal pulses intact symmetric 2+ bilaterally. NEUROLOGICAL: Alert, oriented x 3, CN II-XII grossly intact, motor strength 5/5 all muscle groups, DTR 2+ intact and symmetric, sensation intact to sharp and dull. No abnormal movements or tremors. SKIN: Warm, dry, intact, no rash, abrasions bruises     Vitals:  /73   Pulse (!) 115   Temp 96.8 °F (36 °C)   Resp 16   Ht 5' 2\" (1.575 m)   Wt 92 lb 6.4 oz (41.9 kg)   SpO2 99%   BMI 16.90 kg/m²     Mental Status Examination:    Appearance: Stated age. Gait not assessed. No abnormal movements or tremor. Behavior: Calm, cooperative  Speech: Hypoverbal today  Mood: \"Same\"   Affect: Mood congruent. Thought Process: Appears linear.   Thought Content: Denies SI/HI. No overt delusions or paranoia appreciated. Perceptions: Denies auditory or visual hallucinations at present time. Not responding to internal stimuli. Concentration: Intact. Orientation: to person, place, date, and situation. Language: Intact. Fund of information: Intact. Memory: Recent impaired and remote appear intact. Neurovegitative: Poor appetite and improved sleep. Insight: Limited. Judgment: Limited. DATA:  Lab Results   Component Value Date    WBC 8.3 10/10/2022    HGB 12.6 10/10/2022    HCT 37.4 10/10/2022    MCV 95.4 10/10/2022     10/10/2022     Lab Results   Component Value Date     10/10/2022    K 3.6 10/10/2022     10/10/2022    CO2 23 10/10/2022    BUN 10 10/10/2022    CREATININE 0.4 (L) 10/10/2022    GLUCOSE 99 10/10/2022    CALCIUM 9.0 10/10/2022    PROT 6.5 (L) 10/08/2022    LABALBU 4.0 10/08/2022    BILITOT 0.3 10/08/2022    ALKPHOS 63 10/08/2022    AST 24 10/08/2022    ALT 25 10/08/2022    LABGLOM >60 10/10/2022    GFRAA >59 10/10/2022           ASSESSMENT AND PLAN:  DSM-5 DIAGNOSIS:   Impression:  Depression unspecified  Generalized anxiety disorder  Cognitive impairment   R/o Benzodizepine withdrawal   Insomnia unspecified  Failure to thrive  Pancreatic insufficiency  Chronic pain    Patient is meeting the criteria for inpatient psychiatric treatment. Plan:   -Admit to [facility] Unit and monitor on 15 minute checks. Suicide, fall and seizure precautions.  [place] reviewed. -Gather collateral information from family with release.  -Medical monitoring to be performed by Dr. Mansoor Tiwari and associates. Order routine labs. Monitor for wd. Start GBP. Dietary supplement. Monitor weight. MOCA, score 18/30 today.   -Acclimate to the unit. Provide supportive psychotherapy.  -Encourage participation in groups and therapeutic activities as appropriate. Work on coping skills. -Medications:    Remeron to help with depression and insomnia. Discussed alternatives, benefits & risks with patient including - but not limited to - black box warning regarding increase in suicidality (though in children & young adults and lack of evidence of increased risk in those over 24 yrs. old), agranulocytosis, possibility of death given concurrent untreated sleep apnea, hypotension, worsening mood, hyponatremia, Taylor-Shun syndrome, weight gain, dizziness, abnormal dreams/thinking, confusion, myalgia, elevated LFTs. Atarax PRN anxiety.    Offer nicotine patch to help with nicotine withdrawal.    -The risks, benefits, side effects, indications, contraindications, and adverse effects of the medications have been discussed.  -The patient has verbalized understanding and has capacity to give informed consent.  -SW help evaluate home environment and provide outpatient resources.  -Discuss with treatment team.

## 2022-10-11 NOTE — PLAN OF CARE
Problem: Safety - Adult  Goal: Free from fall injury  Outcome: Progressing     Problem: Anxiety  Goal: Will report anxiety at manageable levels  Description: INTERVENTIONS:  1. Administer medication as ordered  2. Teach and rehearse alternative coping skills  3. Provide emotional support with 1:1 interaction with staff  Outcome: Progressing     Problem: Coping  Goal: Pt/Family able to verbalize concerns and demonstrate effective coping strategies  Description: INTERVENTIONS:  1. Assist patient/family to identify coping skills, available support systems and cultural and spiritual values  2. Provide emotional support, including active listening and acknowledgement of concerns of patient and caregivers  3. Reduce environmental stimuli, as able  4. Instruct patient/family in relaxation techniques, as appropriate  5. Assess for spiritual pain/suffering and initiate Spiritual Care, Psychosocial Clinical Specialist consults as needed  Outcome: Progressing     Problem: Confusion  Goal: Confusion, delirium, dementia, or psychosis is improved or at baseline  Description: INTERVENTIONS:  1. Assess for possible contributors to thought disturbance, including medications, impaired vision or hearing, underlying metabolic abnormalities, dehydration, psychiatric diagnoses, and notify attending LIP  2. [place] high risk fall precautions, as indicated  3. Provide frequent short contacts to provide reality reorientation, refocusing and direction  4. Decrease environmental stimuli, including noise as appropriate  5. Monitor and intervene to maintain adequate nutrition, hydration, elimination, sleep and activity  6. If unable to ensure safety without constant attention obtain sitter and review sitter guidelines with assigned personnel  7.  Initiate Psychosocial CNS and Spiritual Care consult, as indicated  Outcome: Progressing     Problem: Behavior  Goal: Pt/Family maintain appropriate behavior and adhere to behavioral management agreement, if implemented  Description: INTERVENTIONS:  1. Assess patient/family's coping skills and  non-compliant behavior (including use of illegal substances)  2. Notify security of behavior or suspected illegal substances which indicate the need for search of the family and/or belongings  3. Encourage verbalization of thoughts and concerns in a socially appropriate manner  4. Utilize positive, consistent limit setting strategies supporting safety of patient, staff and others  5. Encourage participation in the decision making process about the behavioral management agreement  6. If a visitor's behavior poses a threat to safety call refer to organization policy. 7. Initiate consult with , Psychosocial CNS, Spiritual Care as appropriate  Outcome: Progressing     Problem: Depression/Self Harm  Goal: Effect of psychiatric condition will be minimized and patient will be protected from self harm  Description: INTERVENTIONS:  1. Assess impact of patient's symptoms on level of functioning, self care needs and offer support as indicated  2. Assess patient/family knowledge of depression, impact on illness and need for teaching  3. Provide emotional support, presence and reassurance  4. Assess for possible suicidal thoughts or ideation. If patient expresses suicidal thoughts or statements do not leave alone, initiate Suicide Precautions, move to a room close to the nursing station and obtain sitter  5. Initiate consults as appropriate with Mental Health Professional, Spiritual Care, Psychosocial CNS, and consider a recommendation to the LIP for a Psychiatric Consultation  Outcome: Progressing     Problem: Drug Abuse/Detox  Goal: Will have no detox symptoms and will verbalize plan for changing drug-related behavior  Description: INTERVENTIONS:  1. Administer medication as ordered  2. Monitor physical status  3. Provide emotional support with 1:1 interaction with staff  4.  Encourage  recovery focused treatment Outcome: Progressing     Problem: Self Harm/Suicidality  Goal: Will have no self-injury during hospital stay  Description: INTERVENTIONS:  1. Q 30 MINUTES: Routine safety checks  2. Q SHIFT & PRN: Assess risk to determine if routine checks are adequate to maintain patient safety  Outcome: Progressing     Problem: Depression  Goal: Will be euthymic at discharge  Description: INTERVENTIONS:  1. Administer medication as ordered  2. Provide emotional support via 1:1 interaction with staff  3. Encourage involvement in milieu/groups/activities  4. Monitor for social isolation  Outcome: Progressing     Problem: Cel[place]  Goal: Will exhibit normal sleep and speech and no impulsivity  Description: INTERVENTIONS:  1. Administer medication as ordered  2. Set limits on impulsive behavior  3. Make attempts to decrease external stimuli as possible  Outcome: Progressing     Problem: Psychosis  Goal: Will report no hallucinations or delusions  Description: INTERVENTIONS:  1. Administer medication as  ordered  2. Assist with reality testing to support increasing orientation  3. Assess if patient's hallucinations or delusions are encouraging self harm or harm to others and intervene as appropriate  Outcome: Progressing     Problem: Sleep Disturbance  Goal: Will exhibit normal sleeping pattern  Description: INTERVENTIONS:  1. Administer medication as ordered  2. Decrease environmental stimuli, including noise, as appropriate  3. Discourage social isolation and naps during the day  Outcome: Progressing     Problem: Self Care Deficit  Goal: Return ADL status to a safe level of function  Description: INTERVENTIONS:  1. Administer medication as ordered  2. Assess ADL deficits and provide assistive devices as needed  3. Obtain PT/OT consults as needed  4.  Assist and instruct patient to increase activity and self care as tolerated  Outcome: Progressing     Problem: Gastrointestinal - Adult  Goal: Maintains adequate nutritional intake  Outcome: Progressing

## 2022-10-11 NOTE — PROGRESS NOTES
[facility] Daily Shift Assessment-Geriatric Unit  Nursing Progress Note          Room: [address] [place] [address]   Name: Twila Carlisle   Age: 64 y.o. Gender: female   Dx: Depression, unspecified  Precautions: suicide risk, fall risk, and seizure precautions  Inpatient Status: voluntary     SLEEP:    Sleep: Yes,   Sleep Quality Good   Hours Slept: 7   Sleep Medications: Yes  PRN Sleep Meds: No       MEDICAL:      Other PRN Meds: Yes   Med Compliant: Yes   Accu-Chek: No   Oxygen/CPAP/BiPAP: No  CIWA/CINA: No   PAIN Assessment: none  Side Effects from medication: No    COVID TEACHING:    Is Patient experiencing any respiratory symptoms (headache, fever, body aches, cough. Imer Smith ): no  Patient educated by nursing to practice social distancing, wear masks, wash hands frequently: yes    Medical Bed:   Is patient in a medical bed? yes   If medical bed is in use, has nursing secured room while patient is awake and out of the room? yes  Has safety checks by nursing been completed on the bed/room this shift? yes    Protective Factors:    Patient identifies protective factors with nursing staff as follows: Identifies reasons for living: Yes   Supportive Social Network or family: Yes    Belief that suicide is immoral/high spirituality: Yes   Responsibility to family or others/living with family: Yes   Fear of death or dying due to pain and suffering: No   Engaged in work or school: No     If Patient is unable to identify, reason why? NA      Metabolic Screening:    No results found for: LABA1C    Lab Results   Component Value Date    CHOL 183 05/04/2017     Lab Results   Component Value Date    TRIG 152 05/04/2017     Lab Results   Component Value Date    HDL 60 (L) 05/04/2017     No components found for: LDLCAL  No results found for: LABVLDL      Body mass index is 16.9 kg/m².     BP Readings from Last 2 Encounters:   10/10/22 (!) 152/91   10/10/22 117/75         PSYCH:     SI denies suicidal ideation    HI Negative for homicidal ideation AVH:no If Hallucinations are present, describe? NA      Depression: 9 Anxiety: 9       GENERAL:      Appetite: good  Percent Meals: no meals taken this shift   Social: No Speech: normal   Appearance:appropriately dressed, disheveled, and healthy looking  Assistive Devices: noneLevel of Assist: Independent      GROUP:    Group Participation: Yes  Participation LevelMinimal    Participation QualityAppropriate    Notes:   Patient has been in her room during the shift at this time. Patient has been pleasant and cooperative with staff. Patient has voiced no complaints. Patient resting in bed at this time with eyes closed. Continuing to monitor patient for safety with 15 minute rounds. Will continue to monitor.           Electronically signed by Felicity Jimenez RN on 10/11/22 at 6:13 AM CDT

## 2022-10-12 PROCEDURE — 6370000000 HC RX 637 (ALT 250 FOR IP): Performed by: NURSE PRACTITIONER

## 2022-10-12 PROCEDURE — 1240000000 HC EMOTIONAL WELLNESS R&B

## 2022-10-12 PROCEDURE — 6370000000 HC RX 637 (ALT 250 FOR IP): Performed by: PSYCHIATRY & NEUROLOGY

## 2022-10-12 PROCEDURE — 99233 SBSQ HOSP IP/OBS HIGH 50: CPT | Performed by: PSYCHIATRY & NEUROLOGY

## 2022-10-12 RX ORDER — ACETAMINOPHEN 325 MG/1
650 TABLET ORAL EVERY 4 HOURS PRN
Status: DISCONTINUED | OUTPATIENT
Start: 2022-10-12 | End: 2022-10-14 | Stop reason: HOSPADM

## 2022-10-12 RX ORDER — GABAPENTIN 100 MG/1
200 CAPSULE ORAL 3 TIMES DAILY
Status: DISCONTINUED | OUTPATIENT
Start: 2022-10-12 | End: 2022-10-13

## 2022-10-12 RX ADMIN — MIRTAZAPINE 3.75 MG: 7.5 TABLET ORAL at 20:45

## 2022-10-12 RX ADMIN — PRAVASTATIN SODIUM 40 MG: 20 TABLET ORAL at 20:46

## 2022-10-12 RX ADMIN — HYDROXYZINE HYDROCHLORIDE 25 MG: 25 TABLET, FILM COATED ORAL at 20:46

## 2022-10-12 RX ADMIN — Medication 3 MG: at 20:45

## 2022-10-12 RX ADMIN — GABAPENTIN 300 MG: 300 CAPSULE ORAL at 08:40

## 2022-10-12 RX ADMIN — PANCRELIPASE 12000 UNITS: 60000; 12000; 38000 CAPSULE, DELAYED RELEASE PELLETS ORAL at 11:46

## 2022-10-12 RX ADMIN — FENOFIBRATE 160 MG: 160 TABLET ORAL at 08:40

## 2022-10-12 RX ADMIN — GABAPENTIN 200 MG: 100 CAPSULE ORAL at 20:45

## 2022-10-12 RX ADMIN — GABAPENTIN 200 MG: 100 CAPSULE ORAL at 15:43

## 2022-10-12 RX ADMIN — PANCRELIPASE 12000 UNITS: 60000; 12000; 38000 CAPSULE, DELAYED RELEASE PELLETS ORAL at 08:40

## 2022-10-12 RX ADMIN — PANCRELIPASE 12000 UNITS: 60000; 12000; 38000 CAPSULE, DELAYED RELEASE PELLETS ORAL at 16:47

## 2022-10-12 NOTE — PLAN OF CARE
Problem: Coping  Goal: Pt/Family able to verbalize concerns and demonstrate effective coping strategies  Description: INTERVENTIONS:  1. Assist patient/family to identify coping skills, available support systems and cultural and spiritual values  2. Provide emotional support, including active listening and acknowledgement of concerns of patient and caregivers  3. Reduce environmental stimuli, as able  4. Instruct patient/family in relaxation techniques, as appropriate  5. Assess for spiritual pain/suffering and initiate Spiritual Care, Psychosocial Clinical Specialist consults as needed  10/12/2022 1057 by Era Shields  Outcome: Progressing  Note:                                                                     Group Therapy Note    Date: 10/12/2022  Start Time: 1000  End Time:  1030  Number of Participants: 4    Type of Group: Psychoeducation    Wellness Binder Information  Module Name:  Men's Issues  Session Number:  1    Group Goal for Pt: To improve knowledge of effective stress management techniques    Notes:  Pt demonstrated improved knowledge of effective stress management techniques by actively participating in group discussion. ...... Status After Intervention:  Unchanged    Participation Level:  Active Listener    Participation Quality: Appropriate and Attentive      Speech:  normal      Thought Process/Content: Logical      Affective Functioning: Congruent      Mood: anxious and depressed      Level of consciousness:  Alert and Oriented x4      Response to Learning: Able to verbalize current knowledge/experience, Able to verbalize/acknowledge new learning, and Progressing to goal      Endings: None Reported    Modes of Intervention: Education      Discipline Responsible: Psychoeducational Specialist      Signature:  Era Shields

## 2022-10-12 NOTE — PROGRESS NOTES
[facility] Daily Shift Assessment-Geriatric Unit  Nursing Progress Note          Room: [address] [place] [address]   Name: Erickson Piña   Age: 64 y.o. Gender: female   Dx: Depression, unspecified  Precautions: suicide risk, fall risk, and seizure precautions  Inpatient Status: voluntary     SLEEP:    Sleep: Yes,   Sleep Quality Good   Hours Slept: 7   Sleep Medications: Yes  PRN Sleep Meds: No       MEDICAL:      Other PRN Meds: Yes   Med Compliant: Yes   Accu-Chek: No   Oxygen/CPAP/BiPAP: No  CIWA/CINA: No   PAIN Assessment: present - adequately treated  Side Effects from medication: No    COVID TEACHING:    Is Patient experiencing any respiratory symptoms (headache, fever, body aches, cough. Loreatha Press ): no  Patient educated by nursing to practice social distancing, wear masks, wash hands frequently: yes    Medical Bed:   Is patient in a medical bed? yes   If medical bed is in use, has nursing secured room while patient is awake and out of the room? yes  Has safety checks by nursing been completed on the bed/room this shift? yes    Protective Factors:    Patient identifies protective factors with nursing staff as follows: Identifies reasons for living: Yes   Supportive Social Network or family: No    Belief that suicide is immoral/high spirituality: Yes   Responsibility to family or others/living with family: Yes   Fear of death or dying due to pain and suffering: Yes   Engaged in work or school: No     If Patient is unable to identify, reason why? NA      Metabolic Screening:    Lab Results   Component Value Date    LABA1C 5.7 10/11/2022       Lab Results   Component Value Date    CHOL 151 (L) 10/11/2022    CHOL 183 05/04/2017     Lab Results   Component Value Date    TRIG 96 10/11/2022    TRIG 152 05/04/2017     Lab Results   Component Value Date    HDL 62 (L) 10/11/2022    HDL 60 (L) 05/04/2017     No components found for: LDLCAL  No results found for: LABVLDL      Body mass index is 16.3 kg/m².     BP Readings from Last 2 Encounters: 10/12/22 105/78   10/10/22 117/75         PSYCH:     SI denies suicidal ideation    HI Negative for homicidal ideation        AVH:no If Hallucinations are present, describe? NA      Depression: 8 Anxiety: 10       GENERAL:      Appetite: good  Percent Meals: 100%   Social: Yes Speech: normal   Appearance:appropriately dressed and appropriately groomed  Assistive Devices: noneLevel of Assist: Independent      GROUP:    Group Participation: Yes  Participation LevelActive Listener, Interactive, and Minimal    Participation QualityAppropriate    Notes:  Patient up sitting in the day area at the beginning of the shift. Patient had gotten up and had changed her linens. Patient has been less confused and more alert today. Patient attended groups, was able to do her safety plan. Patient has rested on and off during the day, has snacked frequently and asked to be weighed again this after noon with an increase in her weight from this morning. Weight this morning was 89.2, this afternoon she weighed 92.6. Patient's appetite has improved greatly. Patient has sat and watched TV with other patient and talked with staff. Patient carries a straw she hold like a cigarette. Will continue to monitor for safety and improvements.           Electronically signed by Jory Castañeda RN on 10/12/22 at 5:50 PM CDT

## 2022-10-12 NOTE — PLAN OF CARE
Problem: Safety - Adult  Goal: Free from fall injury  10/11/2022 2023 by Berta Lewis RN  Outcome: Progressing  10/11/2022 0901 by José Luis Santooy RN  Outcome: Progressing

## 2022-10-12 NOTE — PROGRESS NOTES
Treatment Team Note:    Target Symptoms/Reason for admission: Per nursing admission assessment - Reason for Admission: Pt is a 64 yoa,  female who was brought to ER with syncopal episode and admitted to medical, psych consult placed. Pt endored to Psych that she is having passive SI, no plan. Pt has visual hallucinations where she sees a man with a sledge hammer a few times a week and he informs her o read her bible. Pt has had recent psychotropic med changes by her PCP. Patient also received a new medical diagnosis that has caused her stress, as well. Denies past suicide attempts, no previous inpatient admissions or outpatient therapy. Diagnoses per psych provider: Depression, unspecified [S16. A]  Depression, unspecified depression type [F32. A]    Therapist met with treatment team to discuss patients treatment and discharge plans. Patient's aftercare plan is: SW will meet with patient to gather information    Aftercare appointments made: No - SW will make discharge appointments    Pt lives with: spouse    Collateral obtained from:    Collateral obtained on:10/11/22    Attending groups: Yes    Behavior: calm and cooperative    Has patient been completing ADL's:  Yes    SI:  patient denies SI  Plan: no   If yes describe: N/A - patient denies plan  HI:  patient denies HI  If present describe: N/A  Delusions: patient denies delusions  If present describe: N/A  Hallucinations: patient denies hallucinations  If present describe: N/A    Patient rates their -- Depression: 1-10:  8  Anxiety:1-10:  5    Sleeping:Yes    Taking medication: Yes    Misc: Tentative discharge Friday.

## 2022-10-12 NOTE — PROGRESS NOTES
WRAP UP GROUP NOTE:     Patient's Goal:  Providing feedback as to their own progress in the care-plan provided. Pt's have an opportunity to explore self-reflective skills and share any additional cares and concerns not yet addressed. Pt effectively participated.      Energy level:low  Appetite:improving  Concentration: poor  Hallucinations:gone  Depression:improved  Anxiety:improved  How I worked today:tried a little  What helps me sleep:read  Any questions/complaints/comments:no answer

## 2022-10-12 NOTE — PLAN OF CARE
Problem: Safety - Adult  Goal: Free from fall injury  10/12/2022 0958 by Katharine Clark RN  Outcome: Progressing  10/11/2022 2023 by Mina Croft RN  Outcome: Progressing     Problem: Anxiety  Goal: Will report anxiety at manageable levels  Description: INTERVENTIONS:  1. Administer medication as ordered  2. Teach and rehearse alternative coping skills  3. Provide emotional support with 1:1 interaction with staff  10/12/2022 0958 by Katharine Clark RN  Outcome: Progressing  10/11/2022 2023 by Mina Croft RN  Outcome: Progressing     Problem: Coping  Goal: Pt/Family able to verbalize concerns and demonstrate effective coping strategies  Description: INTERVENTIONS:  1. Assist patient/family to identify coping skills, available support systems and cultural and spiritual values  2. Provide emotional support, including active listening and acknowledgement of concerns of patient and caregivers  3. Reduce environmental stimuli, as able  4. Instruct patient/family in relaxation techniques, as appropriate  5. Assess for spiritual pain/suffering and initiate Spiritual Care, Psychosocial Clinical Specialist consults as needed  10/12/2022 0958 by Katharine Clark RN  Outcome: Progressing  10/11/2022 2023 by Mina Croft RN  Outcome: Progressing     Problem: Confusion  Goal: Confusion, delirium, dementia, or psychosis is improved or at baseline  Description: INTERVENTIONS:  1. Assess for possible contributors to thought disturbance, including medications, impaired vision or hearing, underlying metabolic abnormalities, dehydration, psychiatric diagnoses, and notify attending LIP  2. [place] high risk fall precautions, as indicated  3. Provide frequent short contacts to provide reality reorientation, refocusing and direction  4. Decrease environmental stimuli, including noise as appropriate  5. Monitor and intervene to maintain adequate nutrition, hydration, elimination, sleep and activity  6.  If unable to ensure safety without constant attention obtain sitter and review sitter guidelines with assigned personnel  7. Initiate Psychosocial CNS and Spiritual Care consult, as indicated  10/12/2022 0958 by Chu Calhoun RN  Outcome: Progressing  10/11/2022 2023 by Dilan Flores RN  Outcome: Progressing     Problem: Behavior  Goal: Pt/Family maintain appropriate behavior and adhere to behavioral management agreement, if implemented  Description: INTERVENTIONS:  1. Assess patient/family's coping skills and  non-compliant behavior (including use of illegal substances)  2. Notify security of behavior or suspected illegal substances which indicate the need for search of the family and/or belongings  3. Encourage verbalization of thoughts and concerns in a socially appropriate manner  4. Utilize positive, consistent limit setting strategies supporting safety of patient, staff and others  5. Encourage participation in the decision making process about the behavioral management agreement  6. If a visitor's behavior poses a threat to safety call refer to organization policy. 7. Initiate consult with , Psychosocial CNS, Spiritual Care as appropriate  10/12/2022 0958 by Chu Calhoun RN  Outcome: Progressing  10/11/2022 2023 by Dilan Flores RN  Outcome: Progressing     Problem: Depression/Self Harm  Goal: Effect of psychiatric condition will be minimized and patient will be protected from self harm  Description: INTERVENTIONS:  1. Assess impact of patient's symptoms on level of functioning, self care needs and offer support as indicated  2. Assess patient/family knowledge of depression, impact on illness and need for teaching  3. Provide emotional support, presence and reassurance  4. Assess for possible suicidal thoughts or ideation. If patient expresses suicidal thoughts or statements do not leave alone, initiate Suicide Precautions, move to a room close to the nursing station and obtain sitter  5.  Initiate consults as appropriate with Mental Health Professional, Spiritual Care, Psychosocial CNS, and consider a recommendation to the LIP for a Psychiatric Consultation  10/12/2022 0958 by Meir Pablo RN  Outcome: Progressing  10/11/2022 2023 by Ovidio Loera RN  Outcome: Progressing  Flowsheets (Taken 10/11/2022 2023)  Effect of psychiatric condition will be minimized and patient will be protected from self harm:   Assess impact of patients symptoms on level of functioning, self care needs and offer support as indicated   Assess patient/family knowledge of depression, impact on illness and need for teaching   Provide emotional support, presence and reassurance     Problem: Drug Abuse/Detox  Goal: Will have no detox symptoms and will verbalize plan for changing drug-related behavior  Description: INTERVENTIONS:  1. Administer medication as ordered  2. Monitor physical status  3. Provide emotional support with 1:1 interaction with staff  4. Encourage  recovery focused treatment   10/12/2022 0958 by Meir Pablo RN  Outcome: Progressing  10/11/2022 2023 by Ovidio Loera RN  Outcome: Progressing     Problem: Self Harm/Suicidality  Goal: Will have no self-injury during hospital stay  Description: INTERVENTIONS:  1. Q 30 MINUTES: Routine safety checks  2. Q SHIFT & PRN: Assess risk to determine if routine checks are adequate to maintain patient safety  10/12/2022 0958 by Meir Pablo RN  Outcome: Progressing  10/11/2022 2023 by Ovidio Loera RN  Outcome: Progressing     Problem: Depression  Goal: Will be euthymic at discharge  Description: INTERVENTIONS:  1. Administer medication as ordered  2. Provide emotional support via 1:1 interaction with staff  3. Encourage involvement in milieu/groups/activities  4. Monitor for social isolation  10/12/2022 0958 by Meir Pablo RN  Outcome: Progressing  10/11/2022 2023 by Ovidio Loera RN  Outcome: Progressing     Problem: Celeste  Goal: Will exhibit normal sleep and speech and no impulsivity  Description: INTERVENTIONS:  1. Administer medication as ordered  2. Set limits on impulsive behavior  3. Make attempts to decrease external stimuli as possible  10/12/2022 0958 by Tim Ayala RN  Outcome: Progressing  10/11/2022 2023 by Berta Lewis RN  Outcome: Progressing     Problem: Psychosis  Goal: Will report no hallucinations or delusions  Description: INTERVENTIONS:  1. Administer medication as  ordered  2. Assist with reality testing to support increasing orientation  3. Assess if patient's hallucinations or delusions are encouraging self harm or harm to others and intervene as appropriate  10/12/2022 0958 by Tim Ayala RN  Outcome: Progressing  10/11/2022 2023 by Berta Lewis RN  Outcome: Progressing     Problem: Sleep Disturbance  Goal: Will exhibit normal sleeping pattern  Description: INTERVENTIONS:  1. Administer medication as ordered  2. Decrease environmental stimuli, including noise, as appropriate  3. Discourage social isolation and naps during the day  10/12/2022 0958 by Tim Ayala RN  Outcome: Progressing  10/11/2022 2023 by Berta Lewis RN  Outcome: Progressing     Problem: Self Care Deficit  Goal: Return ADL status to a safe level of function  Description: INTERVENTIONS:  1. Administer medication as ordered  2. Assess ADL deficits and provide assistive devices as needed  3. Obtain PT/OT consults as needed  4.  Assist and instruct patient to increase activity and self care as tolerated  10/12/2022 0958 by Tim Ayala RN  Outcome: Progressing  10/11/2022 2023 by Berta Lewis RN  Outcome: Progressing     Problem: Gastrointestinal - Adult  Goal: Maintains adequate nutritional intake  10/12/2022 0958 by Tim Ayala RN  Outcome: Progressing  10/11/2022 2023 by Berta Lewis RN  Outcome: Progressing     Problem: Nutrition Deficit:  Goal: Optimize nutritional status  10/12/2022 0958 by Tim Ayala RN  Outcome: Progressing  10/11/2022 2023 by Berta Lewis RN  Outcome: Progressing

## 2022-10-12 NOTE — PROGRESS NOTES
Group Note    Number of Participants in Group: 4  Number of Patients on Unit:4      Patient attended group:Yes  Reason for Absence:  Intervention for patient absence:        Type of Group:   self-inventory    Patient's Goal: See group sheet    Participation Level: Active Listener and Minimal           Patient Response to Learning: Yes    Patient's Behavior: Anxious, Cooperative, and Pleasant    Is Patient Social/Interacting: Yes    Relaxation:   Television:Yes   Reading:No   Game/Puzzle:No   Phone: Yes       Notes/Comments:  Patient sitting in the day area at the beginning of the shift. Patient pleasant and cooperative with staff and peers. Continuing to monitor for safety.       Please see patient's group sheet for patient's comments       Electronically signed by Avni Borja RN on 10/12/22 at 8:20 AM CDT

## 2022-10-13 PROCEDURE — 6370000000 HC RX 637 (ALT 250 FOR IP): Performed by: PSYCHIATRY & NEUROLOGY

## 2022-10-13 PROCEDURE — 99233 SBSQ HOSP IP/OBS HIGH 50: CPT | Performed by: PSYCHIATRY & NEUROLOGY

## 2022-10-13 PROCEDURE — 6370000000 HC RX 637 (ALT 250 FOR IP): Performed by: NURSE PRACTITIONER

## 2022-10-13 PROCEDURE — 1240000000 HC EMOTIONAL WELLNESS R&B

## 2022-10-13 RX ORDER — GABAPENTIN 100 MG/1
100 CAPSULE ORAL 3 TIMES DAILY
Status: DISCONTINUED | OUTPATIENT
Start: 2022-10-13 | End: 2022-10-14

## 2022-10-13 RX ADMIN — HYDROXYZINE HYDROCHLORIDE 25 MG: 25 TABLET, FILM COATED ORAL at 09:47

## 2022-10-13 RX ADMIN — HYDROXYZINE HYDROCHLORIDE 25 MG: 25 TABLET, FILM COATED ORAL at 20:25

## 2022-10-13 RX ADMIN — PANCRELIPASE 12000 UNITS: 60000; 12000; 38000 CAPSULE, DELAYED RELEASE PELLETS ORAL at 16:36

## 2022-10-13 RX ADMIN — PRAVASTATIN SODIUM 40 MG: 20 TABLET ORAL at 20:25

## 2022-10-13 RX ADMIN — MIRTAZAPINE 3.75 MG: 7.5 TABLET ORAL at 20:26

## 2022-10-13 RX ADMIN — FENOFIBRATE 160 MG: 160 TABLET ORAL at 08:12

## 2022-10-13 RX ADMIN — PANCRELIPASE 12000 UNITS: 60000; 12000; 38000 CAPSULE, DELAYED RELEASE PELLETS ORAL at 11:39

## 2022-10-13 RX ADMIN — GABAPENTIN 100 MG: 100 CAPSULE ORAL at 20:25

## 2022-10-13 RX ADMIN — GABAPENTIN 100 MG: 100 CAPSULE ORAL at 13:36

## 2022-10-13 RX ADMIN — GABAPENTIN 200 MG: 100 CAPSULE ORAL at 08:12

## 2022-10-13 RX ADMIN — PANCRELIPASE 12000 UNITS: 60000; 12000; 38000 CAPSULE, DELAYED RELEASE PELLETS ORAL at 07:42

## 2022-10-13 RX ADMIN — Medication 3 MG: at 20:25

## 2022-10-13 ASSESSMENT — PAIN SCALES - GENERAL: PAINLEVEL_OUTOF10: 0

## 2022-10-13 NOTE — PLAN OF CARE
Problem: Safety - Adult  Goal: Free from fall injury  10/12/2022 2001 by Edwin Zaidi RN  Outcome: Progressing  10/12/2022 0958 by Alberto Shearer RN  Outcome: Progressing     Problem: Anxiety  Goal: Will report anxiety at manageable levels  Description: INTERVENTIONS:  1. Administer medication as ordered  2. Teach and rehearse alternative coping skills  3. Provide emotional support with 1:1 interaction with staff  10/12/2022 2001 by Edwin Zaidi RN  Outcome: Progressing  Flowsheets (Taken 10/12/2022 1040 by Alberto Shearer RN)  Will report anxiety at manageable levels: Provide emotional support with 1:1 interaction with staff  10/12/2022 0958 by Alberto Shearer RN  Outcome: Progressing     Problem: Coping  Goal: Pt/Family able to verbalize concerns and demonstrate effective coping strategies  Description: INTERVENTIONS:  1. Assist patient/family to identify coping skills, available support systems and cultural and spiritual values  2. Provide emotional support, including active listening and acknowledgement of concerns of patient and caregivers  3. Reduce environmental stimuli, as able  4. Instruct patient/family in relaxation techniques, as appropriate  5. Assess for spiritual pain/suffering and initiate Spiritual Care, Psychosocial Clinical Specialist consults as needed  10/12/2022 2001 by Edwin Zaidi RN  Outcome: Progressing  10/12/2022 1057 by Aaron Hanley  Outcome: Progressing  Note:                                                                     Group Therapy Note    Date: 10/12/2022  Start Time: 1000  End Time:  1030  Number of Participants: 4    Type of Group: Psychoeducation    Wellness Binder Information  Module Name:  Men's Issues  Session Number:  1    Group Goal for Pt: To improve knowledge of effective stress management techniques    Notes:  Pt demonstrated improved knowledge of effective stress management techniques by actively participating in group discussion. ......     Status After Intervention:  Unchanged    Participation Level: Active Listener    Participation Quality: Appropriate and Attentive      Speech:  normal      Thought Process/Content: Logical      Affective Functioning: Congruent      Mood: anxious and depressed      Level of consciousness:  Alert and Oriented x4      Response to Learning: Able to verbalize current knowledge/experience, Able to verbalize/acknowledge new learning, and Progressing to goal      Endings: None Reported    Modes of Intervention: Education      Discipline Responsible: Psychoeducational Specialist      Signature:  Funmi Snider    10/12/2022 0958 by Ana Nash RN  Outcome: Progressing     Problem: Confusion  Goal: Confusion, delirium, dementia, or psychosis is improved or at baseline  Description: INTERVENTIONS:  1. Assess for possible contributors to thought disturbance, including medications, impaired vision or hearing, underlying metabolic abnormalities, dehydration, psychiatric diagnoses, and notify attending LIP  2. [place] high risk fall precautions, as indicated  3. Provide frequent short contacts to provide reality reorientation, refocusing and direction  4. Decrease environmental stimuli, including noise as appropriate  5. Monitor and intervene to maintain adequate nutrition, hydration, elimination, sleep and activity  6. If unable to ensure safety without constant attention obtain sitter and review sitter guidelines with assigned personnel  7. Initiate Psychosocial CNS and Spiritual Care consult, as indicated  10/12/2022 2001 by Anneliese Spencer RN  Outcome: Progressing  10/12/2022 0958 by Ana Nash RN  Outcome: Progressing     Problem: Behavior  Goal: Pt/Family maintain appropriate behavior and adhere to behavioral management agreement, if implemented  Description: INTERVENTIONS:  1. Assess patient/family's coping skills and  non-compliant behavior (including use of illegal substances)  2.  Notify security of behavior or suspected illegal substances which indicate the need for search of the family and/or belongings  3. Encourage verbalization of thoughts and concerns in a socially appropriate manner  4. Utilize positive, consistent limit setting strategies supporting safety of patient, staff and others  5. Encourage participation in the decision making process about the behavioral management agreement  6. If a visitor's behavior poses a threat to safety call refer to organization policy. 7. Initiate consult with , Psychosocial CNS, Spiritual Care as appropriate  10/12/2022 2001 by Tim Silverio RN  Outcome: Progressing  10/12/2022 0958 by Mamadou Tena RN  Outcome: Progressing     Problem: Depression/Self Harm  Goal: Effect of psychiatric condition will be minimized and patient will be protected from self harm  Description: INTERVENTIONS:  1. Assess impact of patient's symptoms on level of functioning, self care needs and offer support as indicated  2. Assess patient/family knowledge of depression, impact on illness and need for teaching  3. Provide emotional support, presence and reassurance  4. Assess for possible suicidal thoughts or ideation. If patient expresses suicidal thoughts or statements do not leave alone, initiate Suicide Precautions, move to a room close to the nursing station and obtain sitter  5. Initiate consults as appropriate with Mental Health Professional, Spiritual Care, Psychosocial CNS, and consider a recommendation to the LIP for a Psychiatric Consultation  10/12/2022 2001 by Tim Silverio RN  Outcome: Progressing  Flowsheets (Taken 10/12/2022 1055 by Mamadou Tena RN)  Effect of psychiatric condition will be minimized and patient will be protected from self harm:   Assess impact of patients symptoms on level of functioning, self care needs and offer support as indicated   Provide emotional support, presence and reassurance   Assess for suicidal thoughts or ideation.  If patient expresses suicidal thoughts or statements do not leave alone, initiate Suicide Precautions, move near nurse station, obtain sitter  10/12/2022 0958 by Leslie De La O RN  Outcome: Progressing     Problem: Drug Abuse/Detox  Goal: Will have no detox symptoms and will verbalize plan for changing drug-related behavior  Description: INTERVENTIONS:  1. Administer medication as ordered  2. Monitor physical status  3. Provide emotional support with 1:1 interaction with staff  4. Encourage  recovery focused treatment   10/12/2022 2001 by Alberta Barrientos RN  Outcome: Progressing  10/12/2022 0958 by Leslie De La O RN  Outcome: Progressing     Problem: Self Harm/Suicidality  Goal: Will have no self-injury during hospital stay  Description: INTERVENTIONS:  1. Q 30 MINUTES: Routine safety checks  2. Q SHIFT & PRN: Assess risk to determine if routine checks are adequate to maintain patient safety  10/12/2022 2001 by Alberta Barrientos RN  Outcome: Progressing  10/12/2022 0958 by Leslie De La O RN  Outcome: Progressing     Problem: Depression  Goal: Will be euthymic at discharge  Description: INTERVENTIONS:  1. Administer medication as ordered  2. Provide emotional support via 1:1 interaction with staff  3. Encourage involvement in milieu/groups/activities  4. Monitor for social isolation  10/12/2022 2001 by Alberta Barrientos RN  Outcome: Progressing  10/12/2022 0958 by Leslie De La O RN  Outcome: Progressing     Problem: Celeste  Goal: Will exhibit normal sleep and speech and no impulsivity  Description: INTERVENTIONS:  1. Administer medication as ordered  2. Set limits on impulsive behavior  3. Make attempts to decrease external stimuli as possible  10/12/2022 2001 by Alberta Barrientos RN  Outcome: Progressing  10/12/2022 0958 by Leslie De La O RN  Outcome: Progressing     Problem: Psychosis  Goal: Will report no hallucinations or delusions  Description: INTERVENTIONS:  1. Administer medication as  ordered  2. Assist with reality testing to support increasing orientation  3.  Assess if patient's hallucinations or delusions are encouraging self harm or harm to others and intervene as appropriate  10/12/2022 2001 by María Flores RN  Outcome: Progressing  10/12/2022 0958 by Shaina Arndt RN  Outcome: Progressing     Problem: Sleep Disturbance  Goal: Will exhibit normal sleeping pattern  Description: INTERVENTIONS:  1. Administer medication as ordered  2. Decrease environmental stimuli, including noise, as appropriate  3. Discourage social isolation and naps during the day  10/12/2022 2001 by María Flores RN  Outcome: Progressing  10/12/2022 0958 by Shaina Arndt RN  Outcome: Progressing     Problem: Self Care Deficit  Goal: Return ADL status to a safe level of function  Description: INTERVENTIONS:  1. Administer medication as ordered  2. Assess ADL deficits and provide assistive devices as needed  3. Obtain PT/OT consults as needed  4.  Assist and instruct patient to increase activity and self care as tolerated  10/12/2022 2001 by María Flores RN  Outcome: Progressing  10/12/2022 0958 by Shaina Arndt RN  Outcome: Progressing     Problem: Gastrointestinal - Adult  Goal: Maintains adequate nutritional intake  10/12/2022 2001 by María Flores RN  Outcome: Progressing  10/12/2022 0958 by Shaina Arndt RN  Outcome: Progressing     Problem: Nutrition Deficit:  Goal: Optimize nutritional status  10/12/2022 2001 by María Flores RN  Outcome: Progressing  10/12/2022 0958 by Shaina Arndt RN  Outcome: Progressing

## 2022-10-13 NOTE — H&P
HISTORY and PHYSICAL      CHIEF COMPLAINT:  SI    Reason for Admission:  SI    History Obtained From:  patient, chart    HISTORY OF PRESENT ILLNESS:      The patient is a 64 y.o. female who is admitted to the [place] [address] unit with worsening mood issues. She has no c/o CP or SOA. She has no new abdominal pain or N/V. She has no dysuria. She has no fevers. Past Medical History:        Diagnosis Date    Hyperlipidemia     Osteoarthritis     Psychiatric problem      Past Surgical History:    History reviewed. No pertinent surgical history. Medications Prior to Admission:    Medications Prior to Admission: lipase-protease-amylase (CREON) 68958-95373 units delayed release capsule, Take 2 capsules by mouth 3 times daily (before meals) Take one capsule before snacks  promethazine (PHENERGAN) 25 MG tablet, Take 25 mg by mouth every 6 hours as needed for Nausea  pantoprazole (PROTONIX) 40 MG tablet, Take 40 mg by mouth daily  sucralfate (CARAFATE) 1 GM tablet, Take 1 g by mouth 3 times daily  cariprazine hcl (VRAYLAR) 1.5 MG capsule, Take 1.5 mg by mouth daily  dicyclomine (BENTYL) 10 MG capsule, Take 10 mg by mouth 3 times daily (with meals)  ALPRAZolam (XANAX) 1 MG tablet, Take 1 mg by mouth at bedtime. citalopram (CELEXA) 20 MG tablet, 20 mg daily  fenofibrate (TRIGLIDE) 160 MG tablet, 160 mg daily  HYDROcodone-acetaminophen (NORCO) 7.5-325 MG per tablet, Take 1 tablet by mouth in the morning, at noon, and at bedtime. omeprazole (PRILOSEC) 20 MG delayed release capsule,   pravastatin (PRAVACHOL) 40 MG tablet,   ondansetron (ZOFRAN ODT) 4 MG disintegrating tablet, Take 1 tablet by mouth every 8 hours as needed for Nausea or Vomiting    Allergies:  Tylenol with codeine #3 [acetaminophen-codeine]    Social History:   TOBACCO:   reports that she has been smoking. She has never used smokeless tobacco.  ETOH:   reports that she does not currently use alcohol.   DRUGS: reports no history of drug use. MARITAL STATUS:    OCCUPATION:  not working  Patient currently lives with family       Family History:   History reviewed. No pertinent family history. REVIEW OF SYSTEMS:  Constitutional: neg  CV: neg  Pulmonary: neg  GI: neg  : neg  Psych: SI  Neuro: neg  Skin: neg  MusculoSkeletal: neg  HEENT: neg  Joints: neg    Vitals:  /78   Pulse 100   Temp 97.2 °F (36.2 °C) (Temporal)   Resp 18   Ht 5' 2\" (1.575 m)   Wt 89 lb 2 oz (40.4 kg)   SpO2 100%   BMI 16.30 kg/m²     PHYSICAL EXAM:  Gen: NAD, alert  HEENT: WNL  Lymph: no LAD  Neck: no JVD or masses  Chest: CTA bilat  CV: RRR  Abdomen: NT/ND  Extrem: no C/C/E  Neuro: non focal  Skin: no rashes  Joints: no redness    DATA:  I have reviewed the admission labs and imaging tests.     ASSESSMENT AND PLAN:      Principal Problem:    Depression, SI---follow with Psych    Elevated BP--follow BP    GERD        Shiva Georges MD  8:48 AM 10/13/2022

## 2022-10-13 NOTE — PLAN OF CARE
as appropriate  5. Monitor and intervene to maintain adequate nutrition, hydration, elimination, sleep and activity  6. If unable to ensure safety without constant attention obtain sitter and review sitter guidelines with assigned personnel  7. Initiate Psychosocial CNS and Spiritual Care consult, as indicated  10/13/2022 0850 by Tana Montalvo RN  Outcome: Progressing  10/12/2022 2001 by Mindi Frazier RN  Outcome: Progressing     Problem: Behavior  Goal: Pt/Family maintain appropriate behavior and adhere to behavioral management agreement, if implemented  Description: INTERVENTIONS:  1. Assess patient/family's coping skills and  non-compliant behavior (including use of illegal substances)  2. Notify security of behavior or suspected illegal substances which indicate the need for search of the family and/or belongings  3. Encourage verbalization of thoughts and concerns in a socially appropriate manner  4. Utilize positive, consistent limit setting strategies supporting safety of patient, staff and others  5. Encourage participation in the decision making process about the behavioral management agreement  6. If a visitor's behavior poses a threat to safety call refer to organization policy. 7. Initiate consult with , Psychosocial CNS, Spiritual Care as appropriate  10/13/2022 0850 by Tana Montalvo RN  Outcome: Progressing  10/12/2022 2001 by Mindi Frazier RN  Outcome: Progressing     Problem: Depression/Self Harm  Goal: Effect of psychiatric condition will be minimized and patient will be protected from self harm  Description: INTERVENTIONS:  1. Assess impact of patient's symptoms on level of functioning, self care needs and offer support as indicated  2. Assess patient/family knowledge of depression, impact on illness and need for teaching  3. Provide emotional support, presence and reassurance  4. Assess for possible suicidal thoughts or ideation.  If patient expresses suicidal thoughts or statements do not leave alone, initiate Suicide Precautions, move to a room close to the nursing station and obtain sitter  5. Initiate consults as appropriate with Mental Health Professional, Spiritual Care, Psychosocial CNS, and consider a recommendation to the LIP for a Psychiatric Consultation  10/13/2022 0850 by Indy Navarrete RN  Outcome: Progressing  10/12/2022 2001 by Godfrey Rossi RN  Outcome: Progressing  Flowsheets (Taken 10/12/2022 1055 by Indy Navarrete RN)  Effect of psychiatric condition will be minimized and patient will be protected from self harm:   Assess impact of patients symptoms on level of functioning, self care needs and offer support as indicated   Provide emotional support, presence and reassurance   Assess for suicidal thoughts or ideation. If patient expresses suicidal thoughts or statements do not leave alone, initiate Suicide Precautions, move near nurse station, obtain sitter     Problem: Drug Abuse/Detox  Goal: Will have no detox symptoms and will verbalize plan for changing drug-related behavior  Description: INTERVENTIONS:  1. Administer medication as ordered  2. Monitor physical status  3. Provide emotional support with 1:1 interaction with staff  4. Encourage  recovery focused treatment   10/13/2022 0850 by Indy Navarrete RN  Outcome: Progressing  10/12/2022 2001 by Godfrey Rossi RN  Outcome: Progressing     Problem: Self Harm/Suicidality  Goal: Will have no self-injury during hospital stay  Description: INTERVENTIONS:  1. Q 30 MINUTES: Routine safety checks  2. Q SHIFT & PRN: Assess risk to determine if routine checks are adequate to maintain patient safety  10/13/2022 0850 by Indy Navarrete RN  Outcome: Progressing  10/12/2022 2001 by Godfrey Rossi RN  Outcome: Progressing     Problem: Depression  Goal: Will be euthymic at discharge  Description: INTERVENTIONS:  1. Administer medication as ordered  2. Provide emotional support via 1:1 interaction with staff  3.  Encourage involvement in milieu/groups/activities  4. Monitor for social isolation  10/13/2022 0850 by Tim Serna RN  Outcome: Progressing  10/12/2022 2001 by Carmelina Sprague RN  Outcome: Progressing     Problem: Celeste  Goal: Will exhibit normal sleep and speech and no impulsivity  Description: INTERVENTIONS:  1. Administer medication as ordered  2. Set limits on impulsive behavior  3. Make attempts to decrease external stimuli as possible  10/13/2022 0850 by Tim Serna RN  Outcome: Progressing  10/12/2022 2001 by Carmelina Sprague RN  Outcome: Progressing     Problem: Psychosis  Goal: Will report no hallucinations or delusions  Description: INTERVENTIONS:  1. Administer medication as  ordered  2. Assist with reality testing to support increasing orientation  3. Assess if patient's hallucinations or delusions are encouraging self harm or harm to others and intervene as appropriate  10/13/2022 0850 by Tim Serna RN  Outcome: Progressing  10/12/2022 2001 by Carmelina Sprague RN  Outcome: Progressing     Problem: Sleep Disturbance  Goal: Will exhibit normal sleeping pattern  Description: INTERVENTIONS:  1. Administer medication as ordered  2. Decrease environmental stimuli, including noise, as appropriate  3. Discourage social isolation and naps during the day  10/13/2022 0850 by Tim Serna RN  Outcome: Progressing  10/12/2022 2001 by Carmelina Sprague RN  Outcome: Progressing     Problem: Self Care Deficit  Goal: Return ADL status to a safe level of function  Description: INTERVENTIONS:  1. Administer medication as ordered  2. Assess ADL deficits and provide assistive devices as needed  3. Obtain PT/OT consults as needed  4.  Assist and instruct patient to increase activity and self care as tolerated  10/13/2022 0850 by Tim Serna RN  Outcome: Progressing  10/12/2022 2001 by Carmelina Sprague RN  Outcome: Progressing     Problem: Gastrointestinal - Adult  Goal: Maintains adequate nutritional intake  10/13/2022 0850 by Tim Serna RN  Outcome: Progressing  10/12/2022 2001 by Luis A Palencia, RN  Outcome: Progressing     Problem: Nutrition Deficit:  Goal: Optimize nutritional status  10/13/2022 0850 by Kuldeep Schulte RN  Outcome: Progressing  10/12/2022 2001 by Luis A Palencia, RN  Outcome: Progressing

## 2022-10-13 NOTE — PROGRESS NOTES
Progress Note  Edmundo Carrasquillo  10/13/2022 9:07 AM  Subjective:   Admit Date:   10/10/2022      CC/ADMIT DX:       Interval History:   Reviewed overnight events and nursing notes. She has no new medical issues. I have reviewed all labs/diagnostics from the last 24hrs. ROS:   I have done a 10 point ROS and all are negative, except what is mentioned in the HPI. ADULT DIET; Regular; Safety Tray; Safety Tray (Disposables)  ADULT ORAL NUTRITION SUPPLEMENT; Breakfast, Lunch, Dinner; Standard High Calorie/High Protein Oral Supplement    Medications:      gabapentin  200 mg Oral TID    fenofibrate  160 mg Oral Daily    lipase-protease-amylase  12,000 Units Oral TID WC    pravastatin  40 mg Oral Nightly    mirtazapine  3.75 mg Oral Nightly    melatonin  3 mg Oral Nightly    nicotine  1 patch TransDERmal Daily           Objective:   Vitals: /78   Pulse 100   Temp 97.2 °F (36.2 °C) (Temporal)   Resp 18   Ht 5' 2\" (1.575 m)   Wt 89 lb 2 oz (40.4 kg)   SpO2 100%   BMI 16.30 kg/m²  No intake or output data in the 24 hours ending 10/13/22 0907  General appearance: alert and cooperative with exam  Lungs: clear to auscultation bilaterally  Heart: regular rate and rhythm, S1, S2 normal, no murmur, click, rub or gallop  Abdomen: soft, non-tender; bowel sounds normal; no masses,  no organomegaly  Extremities: extremities normal, atraumatic, no cyanosis or edema  Neurologic:  No obvious focal neurologic deficits. Assessment and Plan:   Principal Problem:    Depression, unspecified  Active Problems:    Severe malnutrition (HCC)    Depression, unspecified depression type  Resolved Problems:    * No resolved hospital problems. *      Plan:   Continue present medication(s)    Follow with Psych   Follow BP      Discharge planning:   her home     Reviewed treatment plans with the patient and/or family.              Electronically signed by Anneliese Terry MD on 10/13/2022 at 9:07 AM

## 2022-10-13 NOTE — PROGRESS NOTES
I Daily Shift Assessment-Geriatric Unit  Nursing Progress Note          Room: [address] [place] [address]   Name: Cesar Hidalgo   Age: 64 y.o. Gender: female   Dx: Depression, unspecified  Precautions: suicide risk, fall risk, and seizure precautions  Inpatient Status: voluntary     SLEEP:    Sleep: Yes,   Sleep Quality Good   Hours Slept:    Sleep Medications: Yes;melatonin 3mg, remeron 3.75  PRN Sleep Meds: No       MEDICAL:      Other PRN Meds: Yes; atarax 25mg, tylenol 650mg  Med Compliant: Yes   Accu-Chek: No   Oxygen/CPAP/BiPAP: No  CIWA/CINA: No   PAIN Assessment: receiving treatment  Side Effects from medication: none voiced    COVID TEACHING:    Is Patient experiencing any respiratory symptoms (headache, fever, body aches, cough. Melissa Mcintyre ): no  Patient educated by nursing to practice social distancing, wear masks, wash hands frequently: yes    Medical Bed:   Is patient in a medical bed? yes   If medical bed is in use, has nursing secured room while patient is awake and out of the room? yes  Has safety checks by nursing been completed on the bed/room this shift? yes    Protective Factors:    Patient identifies protective factors with nursing staff as follows: Identifies reasons for living: Yes   Supportive Social Network or family: No    Belief that suicide is immoral/high spirituality: Yes   Responsibility to family or others/living with family: Yes   Fear of death or dying due to pain and suffering: Yes   Engaged in work or school: No     If Patient is unable to identify, reason why?  N/A      Metabolic Screening:    Lab Results   Component Value Date    LABA1C 5.7 10/11/2022       Lab Results   Component Value Date    CHOL 151 (L) 10/11/2022    CHOL 183 05/04/2017     Lab Results   Component Value Date    TRIG 96 10/11/2022    TRIG 152 05/04/2017     Lab Results   Component Value Date    HDL 62 (L) 10/11/2022    HDL 60 (L) 05/04/2017     No components found for: LDLCAL  No results found for: LABVLDL      Body mass index is 16.3 kg/m². BP Readings from Last 2 Encounters:   10/12/22 (!) 138/90   10/10/22 117/75         PSYCH:     SI denies suicidal ideation    HI Negative for homicidal ideation        AVH:no If Hallucinations are present, describe? denies      Depression: \"better\" Anxiety: 7       GENERAL:      Appetite: good  Percent Meals: no meals consumed during this shift   Social: No, only speaks when engaged Speech: normal   Appearance:appropriately dressed  Assistive Devices: noneLevel of Assist: Independent      GROUP:    Group Participation: Yes  Participation LevelMinimal    Participation QualityAppropriate    Notes: Pt has spent most of this shift resting in her room. She was in the tv area for a short time at the beginning of the shift only. She is not social with staff or peer and speaks only when engaged. Pt is alert and oriented x 4 tonight. She reports her depression is \"better\" and rates her anxiety a 7. She denies SI, HI and AVH tonight. She reports her sleep is \"good\" and her appetite is \"good. \"   She was med compliant tonight. She received atarax for anxiety. She has been resting well since going to bed. Will continue to monitor for safety as ordered.                Electronically signed by Rosendo Mccann RN on 10/13/22 at 2:12 AM CDT

## 2022-10-13 NOTE — PROGRESS NOTES
Physician Progress Note      Melvi Cifuentes  CSN #:                  [id number]  :                       1960  ADMIT DATE:       10/8/2022 1:05 PM  [address] [place] DATE:        10/10/2022 1:21 PM  RESPONDING  PROVIDER #:        Darryl Sin          QUERY TEXT:    Patient admitted with syncopal episode. noted to have hyponatremia. If   possible, please document in progress notes and discharge summary after study   the etiology of the syncope: The medical record reflects the following:  Risk Factors: Hyponatremia. urine drug screen positive for opiates  Clinical Indicators:  138 136. UDS positive for opiates. Treatment: NS @ 125 ml/hr. strict I&Os, hold SSRI. serial chemistry panels. Options provided:  -- Syncope due to hyponatremia  -- Syncope due to opiate use  -- syncope due to unknown etiology  -- Other - I will add my own diagnosis  -- Disagree - Not applicable / Not valid  -- Disagree - Clinically unable to determine / Unknown  -- Refer to Clinical Documentation Reviewer    PROVIDER RESPONSE TEXT:    The syncope is due to unknown etiology. Query created by: Nay Calderon on 10/12/2022 10:40 AM      QUERY TEXT:    Patient admitted with syncopal episode. Dietician consult noted dated 10/10   reflect severe malnutrition. If possible, please document in progress notes   and discharge summary if you are evaluating and /or treating any of the   following:  ]    The medical record reflects the following:  Risk Factors: acute and chronic illness. Clinical Indicators:  Unintended weight loss related to acute injury/trauma,   psychological cause or life stress as evidenced by BMI, weight loss, weight   loss greater than or equal to 10% in 6 months, per dietary note 10/10. Treatment: Dietician consult, regular diet with ONS    ASPEN Criteria:    https://aspenjournals. onlinelibrary. shipley. com/doi/full/10.1177/705370671430831  5  Options provided:  -- Protein calorie malnutrition severe  -- Underweight with BMI 15.59  -- Other - I will add my own diagnosis  -- Disagree - Not applicable / Not valid  -- Disagree - Clinically unable to determine / Unknown  -- Refer to Clinical Documentation Reviewer    PROVIDER RESPONSE TEXT:    This patient is underweight with a BMI of 15.59.     Query created by: Wilfrid Abdul on 10/12/2022 10:46 AM      Electronically signed by:  Soundra Runner 10/13/2022 8:14 AM

## 2022-10-13 NOTE — PROGRESS NOTES
[facility] Daily Shift Assessment-Geriatric Unit  Nursing Progress Note          Room: [address] [place] [address]   Name: Ashely Lunsford   Age: 64 y.o. Gender: female   Dx: Depression, unspecified  Precautions: suicide risk, fall risk, and seizure precautions  Inpatient Status: voluntary     SLEEP:    Sleep: Yes,   Sleep Quality Good   Hours Slept: 7   Sleep Medications: Yes  PRN Sleep Meds: No       MEDICAL:      Other PRN Meds: Yes   Med Compliant: Yes   Accu-Chek: No   Oxygen/CPAP/BiPAP: No  CIWA/CINA: No   PAIN Assessment: none  Side Effects from medication: No    COVID TEACHING:    Is Patient experiencing any respiratory symptoms (headache, fever, body aches, cough. Dexter Mering ): no  Patient educated by nursing to practice social distancing, wear masks, wash hands frequently: yes    Medical Bed:   Is patient in a medical bed? yes   If medical bed is in use, has nursing secured room while patient is awake and out of the room? yes  Has safety checks by nursing been completed on the bed/room this shift? yes    Protective Factors:    Patient identifies protective factors with nursing staff as follows: Identifies reasons for living: Yes   Supportive Social Network or family: Yes    Belief that suicide is immoral/high spirituality: Yes   Responsibility to family or others/living with family: Yes   Fear of death or dying due to pain and suffering: No   Engaged in work or school: No     If Patient is unable to identify, reason why? NA      Metabolic Screening:    Lab Results   Component Value Date    LABA1C 5.7 10/11/2022       Lab Results   Component Value Date    CHOL 151 (L) 10/11/2022    CHOL 183 05/04/2017     Lab Results   Component Value Date    TRIG 96 10/11/2022    TRIG 152 05/04/2017     Lab Results   Component Value Date    HDL 62 (L) 10/11/2022    HDL 60 (L) 05/04/2017     No components found for: LDLCAL  No results found for: LABVLDL      Body mass index is 16.71 kg/m².     BP Readings from Last 2 Encounters:   10/13/22 115/78 10/10/22 117/75         PSYCH:     SI denies suicidal ideation    HI Negative for homicidal ideation        AVH:no If Hallucinations are present, describe? NA      Depression: 4 Anxiety: 4       GENERAL:      Appetite: good  Percent Meals: 75%   Social: Yes Speech: normal   Appearance:appropriately dressed and appropriately groomed  Assistive Devices: noneLevel of Assist: Independent      GROUP:    Group Participation: Yes  Participation LevelActive Listener and Minimal    Participation QualityAppropriate    Notes:  Patient up and in the day area for breakfast, and then sat in the TV area and watched TV with another patient for a while. Patient more alert today, less confusion noted. Patient has been observed today eating several snacks, and reports she is hungry. Patient requested to be weighed today, weight 91.6 pounds. Patient is not wearing dentures because they are to big after she lost weight. Patient has watched TV and gone to room and rested at different times during the day. Patient reported she is hoping to be able to go home on Friday. Will continue to monitor for safety.           Electronically signed by Felicity Jimenez RN on 10/13/22 at 2:02 PM CDT

## 2022-10-13 NOTE — PROGRESS NOTES
Group Note    Number of Participants in Group: 3  Number of Patients on Unit:3      Patient attended group:Yes  Reason for Absence:  Intervention for patient absence:        Type of Group:   self-inventory    Patient's Goal: See group sheet    Participation Level:    Interactive and Minimal           Patient Response to Learning: Yes    Patient's Behavior: Withdrawn, Cooperative, and Pleasant    Is Patient Social/Interacting: Yes    Relaxation:   Television:Yes   Reading:No   Game/Puzzle:No   Phone: Yes       Notes/Comments:  Patient up when shift started this morning. Patient pleasant and cooperative. Patient reports she rested well with medication. Patient reported that her depression,anxiety, and lisseth were improved. Patient denies hallucinations, SI, and anxiety. Patient reports she was going to work on her self esteem today and plans to work on getting her life back after discharge. Will continue to monitor for safety.       Please see patient's group sheet for patient's comments       Electronically signed by Veronica Jiang RN on 10/13/22 at 9:06 AM CDT

## 2022-10-13 NOTE — PROGRESS NOTES
Department of Psychiatry  Attending Progress Note     Chief complaint: \"I'm ok\"    SUBJECTIVE:   Chart reviewed, discussed with the team. No major issues overnight. Patient is med-compliant. No SEs. Function improved. Performs ADLs. No suicidality. No issues with sleep and appetite. Gained some weight. Drinking Ensure. Patient seen in her room resting. Calm and cooperative. Smiled. Oriented to person, place, date, situation. States she slept \"great\". Denies SI/HI/AVH. Rates depression 4/10, anxiety 2/10. Denies physical complaints. Reports feeling less confused. Enjoying groups. Hoping to go home soon.  is supportive and will take her back. OBJECTIVE    Physical  Wt Readings from Last 3 Encounters:   10/13/22 91 lb 6 oz (41.4 kg)   10/10/22 92 lb 9.6 oz (42 kg)   04/09/22 114 lb (51.7 kg)     Temp Readings from Last 3 Encounters:   10/13/22 97.2 °F (36.2 °C) (Temporal)   10/10/22 97.9 °F (36.6 °C) (Temporal)   04/09/22 98 °F (36.7 °C) (Temporal)     BP Readings from Last 3 Encounters:   10/13/22 115/78   10/10/22 117/75   04/09/22 110/62     Pulse Readings from Last 3 Encounters:   10/13/22 100   10/10/22 78   04/09/22 70        Review of Systems: 14-point review of systems negative except as described above    Mental Status Examination:   Appearance: Stated age. Thin. Wears glasses. Gait stable. No abnormal movements or tremor. Behavior: Calm, cooperative, smiled  Speech: Normal in tone, volume, and quality. No slurring, dysarthria or pressured speech noted. Mood: \"Ok. Hope to go home\"   Affect: Mood congruent. Thought Process: Appears linear. Thought Content: Denies suicidal and homicidal ideation. No overt delusions or paranoia appreciated. Perceptions: Denies auditory or visual hallucinations at present time. Not responding to internal stimuli. Concentration: Intact. Orientation: to person, place, date, and situation. Language: Intact. Fund of information: Intact.    Memory: Recent impaired and remote appear intact. Neurovegitative: Improved appetite and sleep. Insight: Improving. Judgment: Improving.     Data  Lab Results   Component Value Date    WBC 8.3 10/10/2022    HGB 12.6 10/10/2022    HCT 37.4 10/10/2022    MCV 95.4 10/10/2022     10/10/2022      Lab Results   Component Value Date     10/10/2022    K 3.6 10/10/2022     10/10/2022    CO2 23 10/10/2022    BUN 10 10/10/2022    CREATININE 0.4 (L) 10/10/2022    GLUCOSE 99 10/10/2022    CALCIUM 9.0 10/10/2022    PROT 6.5 (L) 10/08/2022    LABALBU 4.0 10/08/2022    BILITOT 0.3 10/08/2022    ALKPHOS 63 10/08/2022    AST 24 10/08/2022    ALT 25 10/08/2022    LABGLOM >60 10/10/2022    GFRAA >59 10/10/2022       Medications    Current Facility-Administered Medications:     acetaminophen (TYLENOL) tablet 650 mg, 650 mg, Oral, Q4H PRN, Jia Santiago MD    gabapentin (NEURONTIN) capsule 200 mg, 200 mg, Oral, TID, Teressa Zapata MD, 200 mg at 10/13/22 3617    risperiDONE (RISPERDAL M-TABS) disintegrating tablet 1 mg, 1 mg, Oral, Q6H PRN, Teressa Zapata MD    fenofibrate (TRIGLIDE) tablet 160 mg, 160 mg, Oral, Daily, BENITA Neves CNP, 160 mg at 10/13/22 1252    lipase-protease-amylase (CREON) delayed release capsule 12,000 Units, 12,000 Units, Oral, TID WC, BENITA Neves CNP, 12,000 Units at 10/13/22 0742    pravastatin (PRAVACHOL) tablet 40 mg, 40 mg, Oral, Nightly, BENITA Neves CNP, 40 mg at 10/12/22 2046    mirtazapine (REMERON) tablet 3.75 mg, 3.75 mg, Oral, Nightly, Teressa Zapata MD, 3.75 mg at 10/12/22 2045    melatonin tablet 3 mg, 3 mg, Oral, Nightly, Teressa aZpata MD, 3 mg at 10/12/22 2045    hydrOXYzine HCl (ATARAX) tablet 25 mg, 25 mg, Oral, TID PRN, Teressa Zapata MD, 25 mg at 10/13/22 0947    polyethylene glycol (GLYCOLAX) packet 17 g, 17 g, Oral, Daily PRN, Teressa Zapata MD    nicotine (NICODERM CQ) 14 MG/24HR 1 patch, 1 patch, TransDERmal, Daily, Radha

## 2022-10-13 NOTE — PROGRESS NOTES
WRAP UP GROUP NOTE:     Patient's Goal:  Providing feedback as to their own progress in the care-plan provided. Pt's have an opportunity to explore self-reflective skills and share any additional cares and concerns not yet addressed. Pt effectively participated.      Energy level:normal  Appetite:good  Concentration: improving  Hallucinations:gone  Depression:improved  Anxiety:improved  How I worked today:tried a little  What helps me sleep:read  Any questions/complaints/comments:no answer

## 2022-10-14 VITALS
BODY MASS INDEX: 16.82 KG/M2 | WEIGHT: 91.38 LBS | HEIGHT: 62 IN | SYSTOLIC BLOOD PRESSURE: 115 MMHG | RESPIRATION RATE: 18 BRPM | HEART RATE: 105 BPM | OXYGEN SATURATION: 100 % | DIASTOLIC BLOOD PRESSURE: 75 MMHG | TEMPERATURE: 97.3 F

## 2022-10-14 PROBLEM — R55 SYNCOPE AND COLLAPSE: Status: RESOLVED | Noted: 2022-10-08 | Resolved: 2022-10-14

## 2022-10-14 PROBLEM — F41.1 GENERALIZED ANXIETY DISORDER: Chronic | Status: ACTIVE | Noted: 2022-10-14

## 2022-10-14 PROBLEM — G31.84 MILD COGNITIVE IMPAIRMENT: Status: ACTIVE | Noted: 2022-10-14

## 2022-10-14 PROBLEM — F32.A DEPRESSION, UNSPECIFIED DEPRESSION TYPE: Status: RESOLVED | Noted: 2022-10-10 | Resolved: 2022-10-14

## 2022-10-14 PROBLEM — E87.1 HYPONATREMIA: Status: RESOLVED | Noted: 2022-10-08 | Resolved: 2022-10-14

## 2022-10-14 PROBLEM — G47.00 INSOMNIA, UNSPECIFIED: Chronic | Status: ACTIVE | Noted: 2022-10-14

## 2022-10-14 PROBLEM — F32.A DEPRESSION: Status: RESOLVED | Noted: 2022-10-08 | Resolved: 2022-10-14

## 2022-10-14 PROCEDURE — 99239 HOSP IP/OBS DSCHRG MGMT >30: CPT | Performed by: PSYCHIATRY & NEUROLOGY

## 2022-10-14 PROCEDURE — 6370000000 HC RX 637 (ALT 250 FOR IP): Performed by: NURSE PRACTITIONER

## 2022-10-14 PROCEDURE — 6370000000 HC RX 637 (ALT 250 FOR IP): Performed by: PSYCHIATRY & NEUROLOGY

## 2022-10-14 PROCEDURE — 5130000000 HC BRIDGE APPOINTMENT

## 2022-10-14 RX ORDER — LANOLIN ALCOHOL/MO/W.PET/CERES
3 CREAM (GRAM) TOPICAL NIGHTLY
Qty: 30 TABLET | Refills: 1 | Status: SHIPPED | OUTPATIENT
Start: 2022-10-14 | End: 2022-11-13

## 2022-10-14 RX ORDER — MIRTAZAPINE 7.5 MG/1
3.75 TABLET, FILM COATED ORAL NIGHTLY
Qty: 15 TABLET | Refills: 1 | Status: SHIPPED | OUTPATIENT
Start: 2022-10-14 | End: 2022-11-13

## 2022-10-14 RX ADMIN — PANCRELIPASE 12000 UNITS: 60000; 12000; 38000 CAPSULE, DELAYED RELEASE PELLETS ORAL at 07:31

## 2022-10-14 RX ADMIN — FENOFIBRATE 160 MG: 160 TABLET ORAL at 08:48

## 2022-10-14 RX ADMIN — GABAPENTIN 100 MG: 100 CAPSULE ORAL at 08:48

## 2022-10-14 NOTE — DISCHARGE SUMMARY
Discharge Summary     Patient ID:  Sai Thornton  [id number]  89 y.o.  1960    Admit date: 10/10/2022  Discharge date: 10/14/2022    Admitting Physician: Nguyễn Pierce MD   Attending Physician: Nguyễn Pierce MD  Discharge Provider: Nguyễn Pierce MD     Admission Diagnoses:   Depression unspecified  Generalized anxiety disorder  Cognitive impairment, mild   R/o Benzodizepine withdrawal   Insomnia unspecified  Failure to thrive  Pancreatic insufficiency  Chronic pain    Discharge Diagnoses:   Depression unspecified  Generalized anxiety disorder  Cognitive impairment, mild   Insomnia unspecified  Pancreatic insufficiency  Chronic pain    Admission Condition: poor    Discharged Condition: stable    Indication for Admission: safety concern    CHIEF COMPLAINT:  \"I'm still going through withdrawal\"     History obtained from: patient, chart     HISTORY OF PRESENT ILLNESS:    71-year-old white female with history of depression, pancreatic insufficiency, chronic pain, who presented to the ER status post a syncopal episode. Reportedly, she has been weaning off Xanax and Norco (on them for about 10 yrs, tried to quit cold turkey in June, was taking 1 mg nightly until recetly). Per YAD[facility], Xanax last filled on 9/12/22. Celexa was stopped and she was started on Vraylar by her PCP. She came with hyponatremia which improved. UDS positive for opiate. No evidence of UTI. CT head with no acute changes. Noted old stroke. Patient was transferred to psychiatry yesterday due to [facility]. Patient stated she has been depressed and very anxious. Stressed out about the new diagnosis of pancreatic insufficiency. Sleeping poorly. Lost her appetite. Not functioning at home. Reports tearfullness, episodes of agitation.  noticed decline about 2 weeks ago. Patient has been acting out of character, going to the yard at night, pacing the house. Patient complained of confusion and forgetfulness.   Some confusion noted on the unit last night. Patient is calm and cooperative when seen this morning. She is resting in bed. States she slept well. Ate   Has not noticed any improvement in her mood. No suicidal thoughts at this time. Worried she is going through withdrawal.  We processed her feelings. Supportive therapy provided. Psychiatric History:    Diagnoses: depression  Suicide attempts/gestures: Denies   Prior hospitalizations: Denies   Medication trials: Celexa, Xanax, Vraylar  Mental health contact: PCP  Head trauma: Denies     Substance Use History:  Drinks 3-4 beers a day. Denies illicits. Recently has been trying to quit smoking. Hospital Course:  Patient was admitted to the behavioral health floor and was acclimated to the unit. She was placed on suicide, fall and seizure precautions. Labs were reviewed and physical exam was completed by Dr. Ehsan Tamayo and associates. Home medications were reconciled. YADY was obtained and reviewed. Psychotropics were adjusted - see below. Patient tolerated all the medications well and without side effects. Patient attended and participated in groups. All interactions with the peers and staff members were appropriate. Behaviorally, she was not a problem. There was no evidence of suicidality or psychosis. Sleep and appetite improved. With the above-mentioned medications changes as well as psychotherapeutic interventions, patient reported considerable improvement in her condition and requested discharge home. It was felt that patient was at her baseline. Patient has no access to guns at home. On 10/14/2022 it was therefore decided to discharge the patient, as it was felt that she received maximal benefit from her hospitalization. This patient is not suicidal, homicidal or psychotic at discharge. She does not present danger to self or others.         Number of antipsychotic medication prescribed at discharge: 0  IF MORE THAN ONE IS USED: NA    History of greater than 3 failed multiple monotherapy trials: NA  Monotherapy taper plan/ cross taper in progress: NA  Augmentation of Clozapine: NA    Referral to addiction treatment: patient refused    Prescription for Alcohol or Drug Disorder Medication: patient refused    Prescription for Tobacco Cessation medication: none    If no prescriptions for Tobacco Cessation must document why: patient refused    Consults: Internal medicine    Significant Diagnostic Studies:   Lab Results   Component Value Date    WBC 8.3 10/10/2022    HGB 12.6 10/10/2022    HCT 37.4 10/10/2022    MCV 95.4 10/10/2022     10/10/2022     Lab Results   Component Value Date     10/10/2022    K 3.6 10/10/2022     10/10/2022    CO2 23 10/10/2022    BUN 10 10/10/2022    CREATININE 0.4 (L) 10/10/2022    GLUCOSE 99 10/10/2022    CALCIUM 9.0 10/10/2022    PROT 6.5 (L) 10/08/2022    LABALBU 4.0 10/08/2022    BILITOT 0.3 10/08/2022    ALKPHOS 63 10/08/2022    AST 24 10/08/2022    ALT 25 10/08/2022    LABGLOM >60 10/10/2022    GFRAA >59 10/10/2022         Lab Results   Component Value Date    UXTEIZRP36 511 10/11/2022     Lab Results   Component Value Date    VITD25 39.8 10/11/2022     Lab Results   Component Value Date    CHOL 151 (L) 10/11/2022    CHOL 183 05/04/2017     Lab Results   Component Value Date    TRIG 96 10/11/2022    TRIG 152 05/04/2017     Lab Results   Component Value Date    HDL 62 (L) 10/11/2022    HDL 60 (L) 05/04/2017     Lab Results   Component Value Date    LDLCALC 70 10/11/2022    LDLCALC 93 05/04/2017     No results found for: LABVLDL, VLDL  No results found for: CHOLHDLRATIO  Hemoglobin A1C   Date Value Ref Range Status   10/11/2022 5.7 4.0 - 6.0 % Final     Comment:     HbA1c levels >6% are an indication of hyperglycemia during the preceding 2  to 3 months or longer. HbA1c levels may reach 20% or higher in poorly controlled diabetes. Therapeutic action is suggested at levels above 8%.     Diabetes patients with HbA1c levels below 7% meet the goal of the American  Diabetes Association. HbA1c levels below the established reference range may indicate recent  episodes of hypoglycemia, the presence of Hb variants, or shortened lifetime  of erythrocytes. Lab Results   Component Value Date    TSHFT4 3.26 10/11/2022       Treatments: RN and SW    Mental status examination at the time of discharge:  Alert, Oriented X 4  Appearance:  Improved Hygiene, smiling  Speech with Regular Rate and Rhythm  Eye Contact:  Good  No Psychomotor Agitation/Retardation Noted  Attitude:  Cooperative  Mood:  \"Good.  Excited to go home\"  Affective: Congruent, appropriate to the situation, with a normal range and intensity  Thought Processes:  Coherently communicated, logical and goal oriented  Thought Content:  No Suicidal Ideation, No Homicidal Ideation, No Auditory or Visual Hallucinations, NO Overt Delusions  Insight: Improved  Judgement: Improved  Memory is intact for both remote and recent  Intellectual Functioning:  Within the [address] of Knowledge:  Adequate  Attention and Concentration:  Adequate    Discharge Exam:  Please, see medical note    Disposition: home    Patient Instructions:   Current Discharge Medication List        START taking these medications    Details   mirtazapine (REMERON) 7.5 MG tablet Take 0.5 tablets by mouth nightly  Qty: 15 tablet, Refills: 1      melatonin 3 MG TABS tablet Take 1 tablet by mouth nightly  Qty: 30 tablet, Refills: 1           CONTINUE these medications which have NOT CHANGED    Details   lipase-protease-amylase (CREON) 30832-45501 units delayed release capsule Take 2 capsules by mouth 3 times daily (before meals) Take one capsule before snacks      pantoprazole (PROTONIX) 40 MG tablet Take 40 mg by mouth daily      sucralfate (CARAFATE) 1 GM tablet Take 1 g by mouth 3 times daily      fenofibrate (TRIGLIDE) 160 MG tablet 160 mg daily      pravastatin (PRAVACHOL) 40 MG tablet            STOP taking these medications       promethazine (PHENERGAN) 25 MG tablet Comments:   Reason for Stopping:         cariprazine hcl (VRAYLAR) 1.5 MG capsule Comments:   Reason for Stopping:         dicyclomine (BENTYL) 10 MG capsule Comments:   Reason for Stopping:         ALPRAZolam (XANAX) 1 MG tablet Comments:   Reason for Stopping:         citalopram (CELEXA) 20 MG tablet Comments:   Reason for Stopping:         HYDROcodone-acetaminophen (Omar Jennings) 7.5-325 MG per tablet Comments:   Reason for Stopping:         omeprazole (PRILOSEC) 20 MG delayed release capsule Comments:   Reason for Stopping:         ondansetron (ZOFRAN ODT) 4 MG disintegrating tablet Comments:   Reason for Stopping:               Activity: as tolerated  Diet: regular diet  Wound Care: none needed    Follow-up:   Follow up with PCP in 2 week(s)  on follow up with PCP, please review labs/imaging done during this Hospital stay, and discuss any additional/repeat testing or treatment needed with your PCP    Oct[address] Go to [place] [id number]  Wednesday Oct 19, 2022  Intake/therapy appt hortensia Silvestre at 4:00pm, please provide a photo id, soc sec card, insurance card [facility]   [address] [place] [address], [address]   Phone [phone]   Fax [phone]   CRISIS LINE: [phone]   Annalisa Bobar to [place] [address]  Tuesday Oct 25, 2022  Medication manggement appt with Moon Klein, please provide a current list of medications [address], [address]   Phone [phone]   Fax [phone]   CRISIS LINE: [phone]     Time worked: 34 min    Participation: good    Electronically signed by Donnell Bustos MD on 10/14/2022 at 9:38 AM

## 2022-10-14 NOTE — GROUP NOTE
Group Therapy Note    Date: 10/14/2022    Group Start Time: 0800  Group End Time: 0830  Group Topic: [employer]    [facility]    Sophia Escamilla RN; Sumaya Newman RN    Group Therapy Note                                                                        Group Therapy Note    Date: 10/14/22  Start Time: 0800  End Time: 0830    Number of Participants: 2    Type of Group: Community Meeting    Patient's Goal:  \"going home\"    Notes:      Participation Level:  Active Listener    Participation Quality: Appropriate    Speech:  normal    Thought Process/Content: Linear    Affective Functioning: Congruent and Flat    Mood: anxious and depressed    Level of consciousness:  Alert and Oriented x4    Response to Learning: Able to verbalize current knowledge/experience, Able to verbalize/acknowledge new learning, Able to retain information, and Capable of insight    Modes of Intervention: Education and Support    Discipline Responsible: Registered Nurse    Signature:  Sophia Escamilla RN  Electronically signed by Sophia Escamilla RN on 10/14/2022 at 10:02 AM

## 2022-10-14 NOTE — PROGRESS NOTES
Treatment Team Note:    Target Symptoms/Reason for admission: Per nursing admission assessment - Reason for Admission: Pt is a 64 yoa,  female who was brought to ER with syncopal episode and admitted to medical, psych consult placed. Pt endored to Psych that she is having passive SI, no plan. Pt has visual hallucinations where she sees a man with a sledge hammer a few times a week and he informs her o read her bible. Pt has had recent psychotropic med changes by her PCP. Patient also received a new medical diagnosis that has caused her stress, as well. Denies past suicide attempts, no previous inpatient admissions or outpatient therapy. Diagnoses per psych provider: Depression, unspecified [V07. A]  Depression, unspecified depression type [F32. A]    Therapist met with treatment team to discuss patients treatment and discharge plans. Patient's aftercare plan is: SW will meet with patient to gather information    Aftercare appointments made: No - SW will make discharge appointments    Pt lives with: spouse    Collateral obtained from:    Collateral obtained on:10/11/22    Attending groups:  Minimal    Behavior: calm and cooperative, reports improvement in her depression/anxiety.     Has patient been completing ADL's:  Yes    SI:  patient denies SI  Plan: no   If yes describe: N/A - patient denies plan  HI:  patient denies HI  If present describe: N/A  Delusions: patient denies delusions  If present describe: N/A  Hallucinations: patient denies hallucinations  If present describe: N/A    Patient rates their -- Depression: 1-10:  4  Anxiety:1-10:  4    Sleeping:Yes    Taking medication: Yes    Misc: Pt will be discharged today

## 2022-10-14 NOTE — PROGRESS NOTES
WRAP UP GROUP NOTE    Patient's Goal:  Providing feedback as to their own progress in the care-plan provided. Patients have an opportunity to explore self-reflective skills and share any additional cares and concerns not yet addressed. Pt effectively participated. Energy Level:High   Appetite: Improving  Concentration: Normal  Hallucinations: Gone  Depression: Improved  Anxiety: Improved  How I worked today: Sky Laughter a little  What helps me sleep: Read  Any questions/complaints/comments:       Group/ activities that helped me today were:    Life skills: Need to get on with my life.        Electronically signed by Chele Dawson RN on 10/13/2022 at 11:55 PM

## 2022-10-14 NOTE — PROGRESS NOTES
Behavioral Health   Discharge Note  Bridge Appointment completed: Reviewed Discharge Instructions with patient. Patient verbalizes understanding and agreement with the discharge plan using the teachback method. Referral for Outpatient Tobacco Cessation Counseling, upon discharge (maliha X if applicable and completed):    ( )  Hospital staff assisted patient to call Quit Line or faxed referral                                   during hospitalization                  ( )  Recognizing danger situations (included triggers and roadblocks), if not completed on admission                    ( )  Coping skills (new ways to manage stress, exercise, relaxation techniques, changing routine, distraction), if not completed on admission                                                           ( )  Basic information about quitting (benefits of quitting, techniques in how to quit, available resources, if not completed on admission  ( ) Referral for counseling faxed to [facility]   ( x) Patient refused referral  ( x) Patient refused counseling  (x ) Patient refused smoking cessation medication upon discharge    Vaccinations (maliha X if applicable and completed):  ( ) Patient states already received influenza vaccine elsewhere  ( ) Patient received influenza vaccine during this hospitalization  ( x) Patient refused influenza vaccine at this time      Pt discharged with followings belongings:   Dental Appliances: Partials, Uppers  Vision - Corrective Lenses: Eyeglasses  Hearing Aid: None  Jewelry: None  Body Piercings Removed: No  Clothing: Bathrobe  Other Valuables: Sent home   Valuables sent home withpatient. Valuables retrieved from safe and returned to patient. Patient left department with   via  auto, discharged to  home. Patient education on aftercare instructions: yes  Patient verbalize understanding of AVS:  yes. Suicidal Ideations? No AVH? denies HI?  Negative for homicidal ideation       Status EXAM upon discharge:  Mental Status and Behavioral Exam  Normal: No  Level of Assistance: Independent/Self  Facial Expression: Flat  Affect: Congruent  Level of Consciousness: Alert  Frequency of Checks: 4 times per hour, close  Mood:Normal: No  Mood: Depressed, Anxious  Motor Activity:Normal: Yes  Motor Activity:  (within normal limits)  Eye Contact: Fair  Observed Behavior: Cooperative  Sexual Misconduct History: Current - no  Preception: [place] to person, [place] to time, [place] to place, [place] to situation  Attention:Normal: Yes  Attention:  (concentration intact)  Thought Processes:  (linear)  Thought Content:Normal: Yes  Thought Content:  (denies SI, HI, and AVH)  Depression Symptoms: Change in energy level, Loss of interest (rates depression 6)  Anxiety Symptoms: Generalized (rates anxiety 6)  Celeste Symptoms: No problems reported or observed. Hallucinations: None  Delusions: No  Memory:Normal: Yes  Memory:  (recent and remote intact)  Insight and Judgment: Yes  Insight and Judgment:  (improved insight and judgment)    AVS/Transition Record has been discussed with patient and a copy was given to the patient. The AVS/Transition Record was faxed to the next level of care today.

## 2022-10-14 NOTE — PROGRESS NOTES
[facility] Daily Shift Assessment-Geriatric Unit  Nursing Progress Note          Room: [address] [place] [address]   Name: Ashely Lunsford   Age: 64 y.o. Gender: female   Dx: Depression, unspecified  Precautions: suicide risk, fall risk, and seizure precautions  Inpatient Status: voluntary     SLEEP:    Sleep: Yes,   Sleep Quality Good   Hours Slept:    Sleep Medications: Yes  PRN Sleep Meds: No       MEDICAL:      Other PRN Meds: Yes   Med Compliant: Yes   Accu-Chek: No   Oxygen/CPAP/BiPAP: No  CIWA/CINA: No   PAIN Assessment: none  Side Effects from medication: No    COVID TEACHING:    Is Patient experiencing any respiratory symptoms (headache, fever, body aches, cough. Dexter Mering ): no  Patient educated by nursing to practice social distancing, wear masks, wash hands frequently: yes    Medical Bed:   Is patient in a medical bed? yes   If medical bed is in use, has nursing secured room while patient is awake and out of the room? yes  Has safety checks by nursing been completed on the bed/room this shift? yes    Protective Factors:    Patient identifies protective factors with nursing staff as follows: Identifies reasons for living: Yes   Supportive Social Network or family: Yes    Belief that suicide is immoral/high spirituality: Yes   Responsibility to family or others/living with family: Yes   Fear of death or dying due to pain and suffering: No   Engaged in work or school: No     If Patient is unable to identify, reason why? N/A      Metabolic Screening:    Lab Results   Component Value Date    LABA1C 5.7 10/11/2022       Lab Results   Component Value Date    CHOL 151 (L) 10/11/2022    CHOL 183 05/04/2017     Lab Results   Component Value Date    TRIG 96 10/11/2022    TRIG 152 05/04/2017     Lab Results   Component Value Date    HDL 62 (L) 10/11/2022    HDL 60 (L) 05/04/2017     No components found for: LDLCAL  No results found for: LABVLDL      Body mass index is 16.71 kg/m².     BP Readings from Last 2 Encounters:   10/13/22 123/79 10/10/22 117/75         PSYCH:     SI denies suicidal ideation    HI Negative for homicidal ideation        AVH:no If Hallucinations are present, describe? N/A      Depression: 4 Anxiety: 4       GENERAL:      Appetite: no change from normal  Percent Meals:    Social: No Speech: normal   Appearance:appropriately dressed  Assistive Devices: noneLevel of Assist: Independent      GROUP:    Group Participation: Yes  Participation LevelMinimal    Participation QualityAppropriate    Notes:     Patient was walking around the unit at the beginning of the shift. She went to her room early in the shift to lay down. She reports improvement in both her depression and anxiety this evening. She denies SI, HI and AVH. No complaints voiced this evening.        Electronically signed by Nadia Du RN on 10/14/22 at 12:31 AM CDT

## 2022-10-14 NOTE — PLAN OF CARE
Problem: Safety - Adult  Goal: Free from fall injury  10/14/2022 0932 by Maxim Salinas RN  Outcome: Completed  10/13/2022 2119 by Mitch Schilder, RN  Outcome: Progressing     Problem: Anxiety  Goal: Will report anxiety at manageable levels  Description: INTERVENTIONS:  1. Administer medication as ordered  2. Teach and rehearse alternative coping skills  3. Provide emotional support with 1:1 interaction with staff  10/14/2022 0932 by Maxim Salinas RN  Outcome: Completed  10/13/2022 2119 by Mitch Schilder, RN  Outcome: Progressing  Flowsheets (Taken 10/13/2022 1321 by Leslie De La O RN)  Will report anxiety at manageable levels: Administer medication as ordered     Problem: Coping  Goal: Pt/Family able to verbalize concerns and demonstrate effective coping strategies  Description: INTERVENTIONS:  1. Assist patient/family to identify coping skills, available support systems and cultural and spiritual values  2. Provide emotional support, including active listening and acknowledgement of concerns of patient and caregivers  3. Reduce environmental stimuli, as able  4. Instruct patient/family in relaxation techniques, as appropriate  5. Assess for spiritual pain/suffering and initiate Spiritual Care, Psychosocial Clinical Specialist consults as needed  10/14/2022 0932 by Maxim Salinas RN  Outcome: Completed  10/13/2022 2119 by Mitch Schilder, RN  Outcome: Progressing     Problem: Confusion  Goal: Confusion, delirium, dementia, or psychosis is improved or at baseline  Description: INTERVENTIONS:  1. Assess for possible contributors to thought disturbance, including medications, impaired vision or hearing, underlying metabolic abnormalities, dehydration, psychiatric diagnoses, and notify attending LIP  2. [place] high risk fall precautions, as indicated  3. Provide frequent short contacts to provide reality reorientation, refocusing and direction  4.  Decrease environmental stimuli, including noise as appropriate  5. Monitor and intervene to maintain adequate nutrition, hydration, elimination, sleep and activity  6. If unable to ensure safety without constant attention obtain sitter and review sitter guidelines with assigned personnel  7. Initiate Psychosocial CNS and Spiritual Care consult, as indicated  10/14/2022 0932 by Darin Kessler RN  Outcome: Completed  10/13/2022 2119 by Kesha Olvera RN  Outcome: Progressing     Problem: Behavior  Goal: Pt/Family maintain appropriate behavior and adhere to behavioral management agreement, if implemented  Description: INTERVENTIONS:  1. Assess patient/family's coping skills and  non-compliant behavior (including use of illegal substances)  2. Notify security of behavior or suspected illegal substances which indicate the need for search of the family and/or belongings  3. Encourage verbalization of thoughts and concerns in a socially appropriate manner  4. Utilize positive, consistent limit setting strategies supporting safety of patient, staff and others  5. Encourage participation in the decision making process about the behavioral management agreement  6. If a visitor's behavior poses a threat to safety call refer to organization policy. 7. Initiate consult with , Psychosocial CNS, Spiritual Care as appropriate  10/14/2022 0932 by Darin Kessler RN  Outcome: Completed  10/13/2022 2119 by Kesha Olvera RN  Outcome: Progressing     Problem: Depression/Self Harm  Goal: Effect of psychiatric condition will be minimized and patient will be protected from self harm  Description: INTERVENTIONS:  1. Assess impact of patient's symptoms on level of functioning, self care needs and offer support as indicated  2. Assess patient/family knowledge of depression, impact on illness and need for teaching  3. Provide emotional support, presence and reassurance  4. Assess for possible suicidal thoughts or ideation.  If patient expresses suicidal thoughts or statements do not leave alone, initiate Suicide Precautions, move to a room close to the nursing station and obtain sitter  5. Initiate consults as appropriate with Mental Health Professional, Spiritual Care, Psychosocial CNS, and consider a recommendation to the LIP for a Psychiatric Consultation  10/14/2022 0932 by Devin Cast RN  Outcome: Completed  10/13/2022 2119 by Azul Donovan RN  Outcome: Julia Blanca (Taken 10/13/2022 1338 by Angella Atkins RN)  Effect of psychiatric condition will be minimized and patient will be protected from self harm: Provide emotional support, presence and reassurance     Problem: Drug Abuse/Detox  Goal: Will have no detox symptoms and will verbalize plan for changing drug-related behavior  Description: INTERVENTIONS:  1. Administer medication as ordered  2. Monitor physical status  3. Provide emotional support with 1:1 interaction with staff  4. Encourage  recovery focused treatment   10/14/2022 0932 by Devin Cast RN  Outcome: Completed  10/13/2022 2119 by Azul Donovan RN  Outcome: Progressing     Problem: Self Harm/Suicidality  Goal: Will have no self-injury during hospital stay  Description: INTERVENTIONS:  1. Q 30 MINUTES: Routine safety checks  2. Q SHIFT & PRN: Assess risk to determine if routine checks are adequate to maintain patient safety  10/14/2022 0932 by Devin Cast RN  Outcome: Completed  10/13/2022 2119 by Azul Donovan RN  Outcome: Progressing     Problem: Depression  Goal: Will be euthymic at discharge  Description: INTERVENTIONS:  1. Administer medication as ordered  2. Provide emotional support via 1:1 interaction with staff  3. Encourage involvement in milieu/groups/activities  4.  Monitor for social isolation  10/14/2022 0932 by Devin Cast RN  Outcome: Completed  10/13/2022 2119 by Azul Donovan RN  Outcome: Progressing     Problem: Celeste  Goal: Will exhibit normal sleep and speech and no impulsivity  Description: INTERVENTIONS:  1. Administer medication as ordered  2. Set limits on impulsive behavior  3. Make attempts to decrease external stimuli as possible  10/14/2022 0932 by Rabia Felton RN  Outcome: Completed  10/13/2022 2119 by Elvia Sandoval RN  Outcome: Progressing     Problem: Psychosis  Goal: Will report no hallucinations or delusions  Description: INTERVENTIONS:  1. Administer medication as  ordered  2. Assist with reality testing to support increasing orientation  3. Assess if patient's hallucinations or delusions are encouraging self harm or harm to others and intervene as appropriate  10/14/2022 0932 by Rabia Felton RN  Outcome: Completed  10/13/2022 2119 by Elvia Sandoval RN  Outcome: Progressing     Problem: Sleep Disturbance  Goal: Will exhibit normal sleeping pattern  Description: INTERVENTIONS:  1. Administer medication as ordered  2. Decrease environmental stimuli, including noise, as appropriate  3. Discourage social isolation and naps during the day  10/14/2022 0932 by Rabia Felton RN  Outcome: Completed  10/13/2022 2119 by Elvia Sandoval RN  Outcome: Progressing     Problem: Self Care Deficit  Goal: Return ADL status to a safe level of function  Description: INTERVENTIONS:  1. Administer medication as ordered  2. Assess ADL deficits and provide assistive devices as needed  3. Obtain PT/OT consults as needed  4.  Assist and instruct patient to increase activity and self care as tolerated  10/14/2022 0932 by Rabia Felton RN  Outcome: Completed  10/13/2022 2119 by Elvia Sandoval RN  Outcome: Progressing     Problem: Gastrointestinal - Adult  Goal: Maintains adequate nutritional intake  10/14/2022 0932 by Rabia Felton RN  Outcome: Completed  10/13/2022 2119 by Elvia Sandoval RN  Outcome: Progressing  Flowsheets (Taken 10/13/2022 1321 by Meir Pablo RN)  Maintains adequate nutritional intake:   Monitor percentage of each meal consumed Assist with meals as needed     Problem: Nutrition Deficit:  Goal: Optimize nutritional status  10/14/2022 0932 by Mel Michel RN  Outcome: Completed  10/13/2022 2119 by Roselyn Ordaz RN  Outcome: Progressing Attending

## 2022-10-14 NOTE — PROGRESS NOTES
Progress Note  Edmundo Carrasquillo  10/13/2022 9:37 PM  Subjective:   Admit Date:   10/10/2022      CC/ADMIT DX:       Interval History:   Reviewed overnight events and nursing notes. She denies any new physical complaints. I have reviewed all labs/diagnostics from the last 24hrs. ROS:   I have done a 10 point ROS and all are negative, except what is mentioned in the HPI. ADULT DIET; Regular; Safety Tray; Safety Tray (Disposables)  ADULT ORAL NUTRITION SUPPLEMENT; Breakfast, Lunch, Dinner; Standard High Calorie/High Protein Oral Supplement    Medications:      gabapentin  100 mg Oral TID    fenofibrate  160 mg Oral Daily    lipase-protease-amylase  12,000 Units Oral TID WC    pravastatin  40 mg Oral Nightly    mirtazapine  3.75 mg Oral Nightly    melatonin  3 mg Oral Nightly    nicotine  1 patch TransDERmal Daily           Objective:   Vitals: /79   Pulse (!) 107   Temp 97.7 °F (36.5 °C) (Temporal)   Resp 16   Ht 5' 2\" (1.575 m)   Wt 91 lb 6 oz (41.4 kg)   SpO2 96%   BMI 16.71 kg/m²  No intake or output data in the 24 hours ending 10/13/22 2137  General appearance: alert and cooperative with exam  Lungs: clear to auscultation bilaterally  Heart: regular rate and rhythm, S1, S2 normal, no murmur, click, rub or gallop  Abdomen: soft, non-tender; bowel sounds normal; no masses,  no organomegaly  Extremities: extremities normal, atraumatic, no cyanosis or edema  Neurologic:  No obvious focal neurologic deficits. Assessment and Plan:   Principal Problem:    Depression, unspecified  Active Problems:    Severe malnutrition (HCC)    Depression, unspecified depression type  Resolved Problems:    * No resolved hospital problems. *      Plan:   Continue present medication(s)    Follow with Psych   Monitor BP      Discharge planning:   her home     Reviewed treatment plans with the patient and/or family.              Electronically signed by Davin Rivas MD on 10/13/2022 at 9:37 PM

## 2022-10-14 NOTE — DISCHARGE INSTRUCTIONS
Medications:   Medication Summary Provided. I understand that I should take only the medications on my list.   --why and when I need to take each medication. --which side effects to watch for.   --that I should carry my medication information at all times in case of emergency    Situations. --I will take all medications until discontinued by physician. I will take all my medications to follow up appointments. --check with my physician or pharmacist before taking any new medication, over    the counter product or drink alcohol.   --ask about food, drug and dietary supplement interactions. --discard old lists and update records with medication providers. Behavior Health Follow Up:  Original Referral Source: ED  Discharge Diagnosis: depression   Recomendations for Level of Care: Follow Up  Patient Status at Discharge: Stable  [facility] Hospital  was: [facility]   Aftercare plan faxed:    Faxed by: Social Work staff   Date: 10/14/22  Prescriptions sent to pt pharmacy.     General Information:   Questions regarding your bill: Call [phone]   Suicide Hotline (Rescue Crisis) 4-546.643.7879   To obtain results of pending studies call Medical Records at: [phone]   For emergencies and 24 hour/7 days a week contact information: [phone]

## 2022-10-14 NOTE — PLAN OF CARE
Problem: Safety - Adult  Goal: Free from fall injury  10/13/2022 2119 by Marichuy Villegas RN  Outcome: Progressing     Problem: Coping  Goal: Pt/Family able to verbalize concerns and demonstrate effective coping strategies  Description: INTERVENTIONS:  1. Assist patient/family to identify coping skills, available support systems and cultural and spiritual values  2. Provide emotional support, including active listening and acknowledgement of concerns of patient and caregivers  3. Reduce environmental stimuli, as able  4. Instruct patient/family in relaxation techniques, as appropriate  5. Assess for spiritual pain/suffering and initiate Spiritual Care, Psychosocial Clinical Specialist consults as needed  10/13/2022 2119 by Marichuy Villegas RN  Outcome: Progressing     Problem: Confusion  Goal: Confusion, delirium, dementia, or psychosis is improved or at baseline  Description: INTERVENTIONS:  1. Assess for possible contributors to thought disturbance, including medications, impaired vision or hearing, underlying metabolic abnormalities, dehydration, psychiatric diagnoses, and notify attending LIP  2. [place] high risk fall precautions, as indicated  3. Provide frequent short contacts to provide reality reorientation, refocusing and direction  4. Decrease environmental stimuli, including noise as appropriate  5. Monitor and intervene to maintain adequate nutrition, hydration, elimination, sleep and activity  6. If unable to ensure safety without constant attention obtain sitter and review sitter guidelines with assigned personnel  7. Initiate Psychosocial CNS and Spiritual Care consult, as indicated  10/13/2022 2119 by Marichuy Villegas RN  Outcome: Progressing     Problem: Behavior  Goal: Pt/Family maintain appropriate behavior and adhere to behavioral management agreement, if implemented  Description: INTERVENTIONS:  1.  Assess patient/family's coping skills and  non-compliant behavior (including use of illegal substances)  2. Notify security of behavior or suspected illegal substances which indicate the need for search of the family and/or belongings  3. Encourage verbalization of thoughts and concerns in a socially appropriate manner  4. Utilize positive, consistent limit setting strategies supporting safety of patient, staff and others  5. Encourage participation in the decision making process about the behavioral management agreement  6. If a visitor's behavior poses a threat to safety call refer to organization policy. 7. Initiate consult with , Psychosocial CNS, Spiritual Care as appropriate  10/13/2022 2119 by Nadia Du RN  Outcome: Progressing     Problem: Drug Abuse/Detox  Goal: Will have no detox symptoms and will verbalize plan for changing drug-related behavior  Description: INTERVENTIONS:  1. Administer medication as ordered  2. Monitor physical status  3. Provide emotional support with 1:1 interaction with staff  4. Encourage  recovery focused treatment   10/13/2022 2119 by Nadia Du RN  Outcome: Progressing     Problem: Self Harm/Suicidality  Goal: Will have no self-injury during hospital stay  Description: INTERVENTIONS:  1. Q 30 MINUTES: Routine safety checks  2. Q SHIFT & PRN: Assess risk to determine if routine checks are adequate to maintain patient safety  10/13/2022 2119 by Nadai Du RN  Outcome: Progressing     Problem: Depression  Goal: Will be euthymic at discharge  Description: INTERVENTIONS:  1. Administer medication as ordered  2. Provide emotional support via 1:1 interaction with staff  3. Encourage involvement in milieu/groups/activities  4. Monitor for social isolation  10/13/2022 2119 by Nadia Du RN  Outcome: Progressing     Problem: Celeste  Goal: Will exhibit normal sleep and speech and no impulsivity  Description: INTERVENTIONS:  1. Administer medication as ordered  2. Set limits on impulsive behavior  3.  Make attempts to decrease external stimuli as possible  10/13/2022 2119 by Ivania Pierce RN  Outcome: Progressing     Problem: Psychosis  Goal: Will report no hallucinations or delusions  Description: INTERVENTIONS:  1. Administer medication as  ordered  2. Assist with reality testing to support increasing orientation  3. Assess if patient's hallucinations or delusions are encouraging self harm or harm to others and intervene as appropriate  10/13/2022 2119 by Ivania Pierce RN  Outcome: Progressing     Problem: Sleep Disturbance  Goal: Will exhibit normal sleeping pattern  Description: INTERVENTIONS:  1. Administer medication as ordered  2. Decrease environmental stimuli, including noise, as appropriate  3. Discourage social isolation and naps during the day  10/13/2022 2119 by Ivania Pierce RN  Outcome: Progressing     Problem: Self Care Deficit  Goal: Return ADL status to a safe level of function  Description: INTERVENTIONS:  1. Administer medication as ordered  2. Assess ADL deficits and provide assistive devices as needed  3. Obtain PT/OT consults as needed  4. Assist and instruct patient to increase activity and self care as tolerated  10/13/2022 2119 by Ivania Pierce RN  Outcome: Progressing     Problem: Nutrition Deficit:  Goal: Optimize nutritional status  10/13/2022 2119 by Ivania Pierce RN  Outcome: Progressing     Problem: Anxiety  Goal: Will report anxiety at manageable levels  Description: INTERVENTIONS:  1. Administer medication as ordered  2. Teach and rehearse alternative coping skills  3. Provide emotional support with 1:1 interaction with staff  10/13/2022 2119 by Ivania Pierce RN  Outcome: Progressing  Will report anxiety at manageable levels: Administer medication as ordered    Problem: Depression/Self Harm  Goal: Effect of psychiatric condition will be minimized and patient will be protected from self harm  Description: INTERVENTIONS:  1.  Assess impact of patient's symptoms on level of functioning, self care needs and offer support as indicated  2. Assess patient/family knowledge of depression, impact on illness and need for teaching  3. Provide emotional support, presence and reassurance  4. Assess for possible suicidal thoughts or ideation. If patient expresses suicidal thoughts or statements do not leave alone, initiate Suicide Precautions, move to a room close to the nursing station and obtain sitter  5.  Initiate consults as appropriate with Mental Health Professional, Spiritual Care, Psychosocial CNS, and consider a recommendation to the LIP for a Psychiatric Consultation  10/13/2022 2119 by Leonor Garzon RN  Outcome: Progressing  Effect of psychiatric condition will be minimized and patient will be protected from self harm: Provide emotional support, presence and reassurance  1

## 2022-10-14 NOTE — DISCHARGE INSTR - DIET

## 2022-10-15 NOTE — PROGRESS NOTES
SW attempted to contact pt to follow-up with her after she discharged from the unit yesterday to see if she had any questions or concerns that needed to be addressed. SW called the contact number listed for the pt and it went to voicemail, then said the mailbox was full and cannot except any messages at this time.

## 2023-01-26 ENCOUNTER — TELEPHONE (OUTPATIENT)
Dept: NEUROLOGY | Age: 63
End: 2023-01-26

## 2023-01-26 NOTE — TELEPHONE ENCOUNTER
Received a referral from Dr. Ramirez Wolf office for this patient.  Called and left patient a VM to call our office back to where we can get her scheduled an appointment with KATHLEEN Daniels.

## 2023-01-31 ENCOUNTER — TELEPHONE (OUTPATIENT)
Dept: NEUROLOGY | Age: 63
End: 2023-01-31

## 2023-01-31 NOTE — TELEPHONE ENCOUNTER
Patient  called patient in the hospital and not able to come in for appointment at this time and ask but the referral on hold. Patient will called back when she ready to scheduled .       Thank You

## 2023-05-10 RX ORDER — BUSPIRONE HYDROCHLORIDE 15 MG/1
15 TABLET ORAL 3 TIMES DAILY
COMMUNITY

## 2023-05-10 RX ORDER — ESCITALOPRAM OXALATE 5 MG/1
5 TABLET ORAL DAILY
COMMUNITY
End: 2023-05-16

## 2023-05-10 RX ORDER — DONEPEZIL HYDROCHLORIDE 10 MG/1
10 TABLET, FILM COATED ORAL NIGHTLY
COMMUNITY

## 2023-05-10 RX ORDER — TRAZODONE HYDROCHLORIDE 100 MG/1
100 TABLET ORAL NIGHTLY
COMMUNITY
End: 2023-06-07

## 2023-05-16 ENCOUNTER — OFFICE VISIT (OUTPATIENT)
Dept: GASTROENTEROLOGY | Age: 63
End: 2023-05-16
Payer: COMMERCIAL

## 2023-05-16 VITALS
OXYGEN SATURATION: 97 % | HEART RATE: 91 BPM | SYSTOLIC BLOOD PRESSURE: 137 MMHG | HEIGHT: 63 IN | BODY MASS INDEX: 16.66 KG/M2 | WEIGHT: 94 LBS | DIASTOLIC BLOOD PRESSURE: 80 MMHG

## 2023-05-16 DIAGNOSIS — K86.89 PANCREATIC INSUFFICIENCY: Primary | ICD-10-CM

## 2023-05-16 PROCEDURE — 99204 OFFICE O/P NEW MOD 45 MIN: CPT | Performed by: NURSE PRACTITIONER

## 2023-05-16 RX ORDER — MEMANTINE HYDROCHLORIDE 5 MG/1
5 TABLET ORAL 2 TIMES DAILY
COMMUNITY
Start: 2023-05-02

## 2023-05-16 RX ORDER — HYDROXYZINE HYDROCHLORIDE 25 MG/1
25 TABLET, FILM COATED ORAL EVERY 6 HOURS PRN
COMMUNITY
Start: 2023-03-13

## 2023-05-16 RX ORDER — PANCRELIPASE LIPASE, PANCRELIPASE AMYLASE, AND PANCRELIPASE PROTEASE 4200; 24600; 14200 [USP'U]/1; [USP'U]/1; [USP'U]/1
1 CAPSULE, DELAYED RELEASE ORAL
COMMUNITY
Start: 2023-05-03 | End: 2023-05-16 | Stop reason: SDUPTHER

## 2023-05-16 RX ORDER — PANCRELIPASE LIPASE, PANCRELIPASE AMYLASE, AND PANCRELIPASE PROTEASE 4200; 24600; 14200 [USP'U]/1; [USP'U]/1; [USP'U]/1
1 CAPSULE, DELAYED RELEASE ORAL
Qty: 90 CAPSULE | Refills: 3 | Status: SHIPPED | OUTPATIENT
Start: 2023-05-16 | End: 2023-05-18 | Stop reason: ALTCHOICE

## 2023-05-16 RX ORDER — SERTRALINE HYDROCHLORIDE 100 MG/1
100 TABLET, FILM COATED ORAL DAILY
COMMUNITY
Start: 2023-04-04

## 2023-05-16 ASSESSMENT — ENCOUNTER SYMPTOMS
CHOKING: 0
ANAL BLEEDING: 0
COUGH: 0
RECTAL PAIN: 0
ABDOMINAL DISTENTION: 0
CONSTIPATION: 0
NAUSEA: 0
BLOOD IN STOOL: 0
ABDOMINAL PAIN: 0
SHORTNESS OF BREATH: 0
TROUBLE SWALLOWING: 0
VOMITING: 0
DIARRHEA: 1

## 2023-05-16 NOTE — PROGRESS NOTES
Subjective:     Patient ID: Ashely Lunsford is a 58 y.o. female  PCP: BENITA Arce NP  Referring Provider: BENITA Reis NP    HPI  Patient presents to the office today with the following complaints: New Patient      Patient seen in the office today wanting a second opinion on the diagnosis of pancreatic insufficiency and the treatment. Reports she was diagnosed in the Fall, she was tested at [facility] and I will get those notes and lab results. Reports diarrhea stools 4-6 times per day  She is taking pancreaze with meals and creon with snacks and reports her diarrhea has improved with taking pancreaze, versus creon alone. Her family member is with her and she reports she feels the patient  was taking too much creon when she was first diagnosed with Pancreatic insufficiency. She reports the patient is having memory issues and that is being investigated. Seeing neurology Thursday due to question of dementia     Used to drink alcohol years ago     Assessment:     1.  Pancreatic insufficiency  -     Pancreatic elastase, fecal; Future         Plan:   Continue pancreaze and creon as prescribed  Follow up as needed   Orders  Orders Placed This Encounter   Procedures    Pancreatic elastase, fecal     Standing Status:   Future     Standing Expiration Date:   5/16/2024     Medications  Orders Placed This Encounter   Medications    PANCREAZE 4200-05671 units CPEP delayed release capsule     Sig: Take 1 capsule by mouth 3 times daily (with meals)     Dispense:  90 capsule     Refill:  3         Patient History:     Past Medical History:   Diagnosis Date    Hyperlipidemia     Osteoarthritis     Psychiatric problem        Past Surgical History:   Procedure Laterality Date    COLONOSCOPY  06/2022    no polyps per pt    UPPER GASTROINTESTINAL ENDOSCOPY  06/2022    EPI per pt       Family History   Problem Relation Age of Onset    Liver Cancer Brother 36    Colon Cancer Neg Hx     Colon Polyps Neg Hx

## 2023-05-18 ENCOUNTER — TELEPHONE (OUTPATIENT)
Dept: NEUROLOGY | Age: 63
End: 2023-05-18

## 2023-05-18 ENCOUNTER — HOSPITAL ENCOUNTER (EMERGENCY)
Age: 63
Discharge: ANOTHER HOSPITAL | End: 2023-05-19
Payer: COMMERCIAL

## 2023-05-18 ENCOUNTER — APPOINTMENT (OUTPATIENT)
Dept: CT IMAGING | Age: 63
End: 2023-05-18
Payer: COMMERCIAL

## 2023-05-18 ENCOUNTER — TELEPHONE (OUTPATIENT)
Dept: PSYCHIATRY | Age: 63
End: 2023-05-18

## 2023-05-18 ENCOUNTER — OFFICE VISIT (OUTPATIENT)
Dept: NEUROLOGY | Age: 63
End: 2023-05-18
Payer: COMMERCIAL

## 2023-05-18 VITALS
DIASTOLIC BLOOD PRESSURE: 74 MMHG | HEART RATE: 68 BPM | SYSTOLIC BLOOD PRESSURE: 124 MMHG | BODY MASS INDEX: 16.66 KG/M2 | HEIGHT: 63 IN | OXYGEN SATURATION: 97 % | WEIGHT: 94 LBS

## 2023-05-18 DIAGNOSIS — R45.851 SUICIDAL THOUGHTS: Primary | ICD-10-CM

## 2023-05-18 DIAGNOSIS — K86.89 PANCREATIC INSUFFICIENCY: ICD-10-CM

## 2023-05-18 DIAGNOSIS — R44.3 HALLUCINATIONS: ICD-10-CM

## 2023-05-18 DIAGNOSIS — R41.89 COGNITIVE CHANGES: ICD-10-CM

## 2023-05-18 DIAGNOSIS — R44.3 HALLUCINATIONS: Primary | ICD-10-CM

## 2023-05-18 LAB
ALBUMIN SERPL-MCNC: 4.3 G/DL (ref 3.5–5.2)
ALBUMIN SERPL-MCNC: 5 G/DL (ref 3.5–5.2)
ALP SERPL-CCNC: 71 U/L (ref 35–104)
ALP SERPL-CCNC: 78 U/L (ref 35–104)
ALT SERPL-CCNC: 18 U/L (ref 5–33)
ALT SERPL-CCNC: 19 U/L (ref 5–33)
AMMONIA PLAS-SCNC: 48 UMOL/L (ref 11–51)
AMPHET UR QL SCN: NEGATIVE
ANION GAP SERPL CALCULATED.3IONS-SCNC: 12 MMOL/L (ref 7–19)
ANION GAP SERPL CALCULATED.3IONS-SCNC: 14 MMOL/L (ref 7–19)
AST SERPL-CCNC: 20 U/L (ref 5–32)
AST SERPL-CCNC: 20 U/L (ref 5–32)
BACTERIA URNS QL MICRO: NEGATIVE /HPF
BACTERIA URNS QL MICRO: NEGATIVE /HPF
BARBITURATES UR QL SCN: NEGATIVE
BASOPHILS # BLD: 0.1 K/UL (ref 0–0.2)
BASOPHILS # BLD: 0.1 K/UL (ref 0–0.2)
BASOPHILS NFR BLD: 0.6 % (ref 0–1)
BASOPHILS NFR BLD: 0.6 % (ref 0–1)
BENZODIAZ UR QL SCN: NEGATIVE
BILIRUB SERPL-MCNC: 0.3 MG/DL (ref 0.2–1.2)
BILIRUB SERPL-MCNC: <0.2 MG/DL (ref 0.2–1.2)
BILIRUB UR QL STRIP: NEGATIVE
BILIRUB UR QL STRIP: NEGATIVE
BUN SERPL-MCNC: 10 MG/DL (ref 8–23)
BUN SERPL-MCNC: 10 MG/DL (ref 8–23)
BUPRENORPHINE URINE: NEGATIVE
CALCIUM SERPL-MCNC: 10.3 MG/DL (ref 8.8–10.2)
CALCIUM SERPL-MCNC: 9.5 MG/DL (ref 8.8–10.2)
CANNABINOIDS UR QL SCN: NEGATIVE
CHLORIDE SERPL-SCNC: 102 MMOL/L (ref 98–111)
CHLORIDE SERPL-SCNC: 103 MMOL/L (ref 98–111)
CLARITY UR: ABNORMAL
CLARITY UR: CLEAR
CO2 SERPL-SCNC: 28 MMOL/L (ref 22–29)
CO2 SERPL-SCNC: 28 MMOL/L (ref 22–29)
COCAINE UR QL SCN: NEGATIVE
COLOR UR: YELLOW
COLOR UR: YELLOW
CREAT SERPL-MCNC: 0.5 MG/DL (ref 0.5–0.9)
CREAT SERPL-MCNC: 0.5 MG/DL (ref 0.5–0.9)
CRP SERPL HS-MCNC: <0.3 MG/DL (ref 0–0.5)
CRYSTALS URNS MICRO: ABNORMAL /HPF
CRYSTALS URNS MICRO: ABNORMAL /HPF
DRUG SCREEN COMMENT UR-IMP: ABNORMAL
EOSINOPHIL # BLD: 0.1 K/UL (ref 0–0.6)
EOSINOPHIL # BLD: 0.1 K/UL (ref 0–0.6)
EOSINOPHIL NFR BLD: 1.3 % (ref 0–5)
EOSINOPHIL NFR BLD: 1.5 % (ref 0–5)
EPI CELLS #/AREA URNS AUTO: 0 /HPF (ref 0–5)
EPI CELLS #/AREA URNS AUTO: 1 /HPF (ref 0–5)
ERYTHROCYTE [DISTWIDTH] IN BLOOD BY AUTOMATED COUNT: 13.2 % (ref 11.5–14.5)
ERYTHROCYTE [DISTWIDTH] IN BLOOD BY AUTOMATED COUNT: 13.3 % (ref 11.5–14.5)
ERYTHROCYTE [SEDIMENTATION RATE] IN BLOOD BY WESTERGREN METHOD: 6 MM/HR (ref 0–25)
ETHANOLAMINE SERPL-MCNC: <10 MG/DL (ref 0–0.08)
GLUCOSE SERPL-MCNC: 111 MG/DL (ref 74–109)
GLUCOSE SERPL-MCNC: 96 MG/DL (ref 74–109)
GLUCOSE UR STRIP.AUTO-MCNC: NEGATIVE MG/DL
GLUCOSE UR STRIP.AUTO-MCNC: NEGATIVE MG/DL
HCT VFR BLD AUTO: 41.1 % (ref 37–47)
HCT VFR BLD AUTO: 44.1 % (ref 37–47)
HGB BLD-MCNC: 13.6 G/DL (ref 12–16)
HGB BLD-MCNC: 14.7 G/DL (ref 12–16)
HGB UR STRIP.AUTO-MCNC: NEGATIVE MG/L
HGB UR STRIP.AUTO-MCNC: NEGATIVE MG/L
HYALINE CASTS #/AREA URNS AUTO: 1 /HPF (ref 0–8)
HYALINE CASTS #/AREA URNS AUTO: 5 /HPF (ref 0–8)
IMM GRANULOCYTES # BLD: 0 K/UL
IMM GRANULOCYTES # BLD: 0 K/UL
KETONES UR STRIP.AUTO-MCNC: NEGATIVE MG/DL
KETONES UR STRIP.AUTO-MCNC: NEGATIVE MG/DL
LACTATE BLDV-SCNC: 1.1 MMOL/L (ref 0.5–1.9)
LEUKOCYTE ESTERASE UR QL STRIP.AUTO: ABNORMAL
LEUKOCYTE ESTERASE UR QL STRIP.AUTO: ABNORMAL
LYMPHOCYTES # BLD: 3.3 K/UL (ref 1.1–4.5)
LYMPHOCYTES # BLD: 3.7 K/UL (ref 1.1–4.5)
LYMPHOCYTES NFR BLD: 33.5 % (ref 20–40)
LYMPHOCYTES NFR BLD: 39.3 % (ref 20–40)
MAGNESIUM SERPL-MCNC: 2 MG/DL (ref 1.6–2.4)
MCH RBC QN AUTO: 31.1 PG (ref 27–31)
MCH RBC QN AUTO: 31.3 PG (ref 27–31)
MCHC RBC AUTO-ENTMCNC: 33.1 G/DL (ref 33–37)
MCHC RBC AUTO-ENTMCNC: 33.3 G/DL (ref 33–37)
MCV RBC AUTO: 93.8 FL (ref 81–99)
MCV RBC AUTO: 94 FL (ref 81–99)
METHADONE UR QL SCN: NEGATIVE
METHAMPHETAMINE, URINE: NEGATIVE
MONOCYTES # BLD: 0.8 K/UL (ref 0–0.9)
MONOCYTES # BLD: 0.9 K/UL (ref 0–0.9)
MONOCYTES NFR BLD: 8.6 % (ref 0–10)
MONOCYTES NFR BLD: 8.7 % (ref 0–10)
NEUTROPHILS # BLD: 4.7 K/UL (ref 1.5–7.5)
NEUTROPHILS # BLD: 5.4 K/UL (ref 1.5–7.5)
NEUTS SEG NFR BLD: 49.8 % (ref 50–65)
NEUTS SEG NFR BLD: 55.6 % (ref 50–65)
NITRITE UR QL STRIP.AUTO: NEGATIVE
NITRITE UR QL STRIP.AUTO: NEGATIVE
OPIATES UR QL SCN: NEGATIVE
OXYCODONE UR QL SCN: NEGATIVE
PCP UR QL SCN: NEGATIVE
PH UR STRIP.AUTO: 6.5 [PH] (ref 5–8)
PH UR STRIP.AUTO: 7 [PH] (ref 5–8)
PLATELET # BLD AUTO: 285 K/UL (ref 130–400)
PLATELET # BLD AUTO: 313 K/UL (ref 130–400)
PMV BLD AUTO: 11.2 FL (ref 9.4–12.3)
PMV BLD AUTO: 12 FL (ref 9.4–12.3)
POTASSIUM SERPL-SCNC: 2.8 MMOL/L (ref 3.5–5)
POTASSIUM SERPL-SCNC: 3.1 MMOL/L (ref 3.5–5)
PROPOXYPH UR QL SCN: NEGATIVE
PROT SERPL-MCNC: 7.4 G/DL (ref 6.6–8.7)
PROT SERPL-MCNC: 8.1 G/DL (ref 6.6–8.7)
PROT UR STRIP.AUTO-MCNC: NEGATIVE MG/DL
PROT UR STRIP.AUTO-MCNC: NEGATIVE MG/DL
RBC # BLD AUTO: 4.38 M/UL (ref 4.2–5.4)
RBC # BLD AUTO: 4.69 M/UL (ref 4.2–5.4)
RBC #/AREA URNS AUTO: 1 /HPF (ref 0–4)
RBC #/AREA URNS AUTO: 2 /HPF (ref 0–4)
SARS-COV-2 RDRP RESP QL NAA+PROBE: NOT DETECTED
SODIUM SERPL-SCNC: 143 MMOL/L (ref 136–145)
SODIUM SERPL-SCNC: 144 MMOL/L (ref 136–145)
SP GR UR STRIP.AUTO: 1.01 (ref 1–1.03)
SP GR UR STRIP.AUTO: 1.01 (ref 1–1.03)
TRICYCLIC, URINE: POSITIVE
TSH SERPL DL<=0.005 MIU/L-ACNC: 2.69 UIU/ML (ref 0.35–5.5)
UROBILINOGEN UR STRIP.AUTO-MCNC: 0.2 E.U./DL
UROBILINOGEN UR STRIP.AUTO-MCNC: 0.2 E.U./DL
VIT B12 SERPL-MCNC: 786 PG/ML (ref 211–946)
WBC # BLD AUTO: 9.4 K/UL (ref 4.8–10.8)
WBC # BLD AUTO: 9.8 K/UL (ref 4.8–10.8)
WBC #/AREA URNS AUTO: 25 /HPF (ref 0–5)
WBC #/AREA URNS AUTO: 4 /HPF (ref 0–5)

## 2023-05-18 PROCEDURE — 93005 ELECTROCARDIOGRAM TRACING: CPT | Performed by: NURSE PRACTITIONER

## 2023-05-18 PROCEDURE — 99214 OFFICE O/P EST MOD 30 MIN: CPT | Performed by: NURSE PRACTITIONER

## 2023-05-18 PROCEDURE — 70450 CT HEAD/BRAIN W/O DYE: CPT

## 2023-05-18 PROCEDURE — 80306 DRUG TEST PRSMV INSTRMNT: CPT

## 2023-05-18 PROCEDURE — 82140 ASSAY OF AMMONIA: CPT

## 2023-05-18 PROCEDURE — 6370000000 HC RX 637 (ALT 250 FOR IP): Performed by: NURSE PRACTITIONER

## 2023-05-18 PROCEDURE — 99285 EMERGENCY DEPT VISIT HI MDM: CPT

## 2023-05-18 PROCEDURE — 85025 COMPLETE CBC W/AUTO DIFF WBC: CPT

## 2023-05-18 PROCEDURE — 83605 ASSAY OF LACTIC ACID: CPT

## 2023-05-18 PROCEDURE — 81001 URINALYSIS AUTO W/SCOPE: CPT

## 2023-05-18 PROCEDURE — 2580000003 HC RX 258: Performed by: NURSE PRACTITIONER

## 2023-05-18 PROCEDURE — 87635 SARS-COV-2 COVID-19 AMP PRB: CPT

## 2023-05-18 PROCEDURE — 36415 COLL VENOUS BLD VENIPUNCTURE: CPT

## 2023-05-18 PROCEDURE — 82077 ASSAY SPEC XCP UR&BREATH IA: CPT

## 2023-05-18 PROCEDURE — 83735 ASSAY OF MAGNESIUM: CPT

## 2023-05-18 PROCEDURE — 80053 COMPREHEN METABOLIC PANEL: CPT

## 2023-05-18 PROCEDURE — 87040 BLOOD CULTURE FOR BACTERIA: CPT

## 2023-05-18 RX ORDER — SODIUM CHLORIDE, SODIUM LACTATE, POTASSIUM CHLORIDE, AND CALCIUM CHLORIDE .6; .31; .03; .02 G/100ML; G/100ML; G/100ML; G/100ML
1000 INJECTION, SOLUTION INTRAVENOUS ONCE
Status: COMPLETED | OUTPATIENT
Start: 2023-05-18 | End: 2023-05-18

## 2023-05-18 RX ORDER — QUETIAPINE FUMARATE 25 MG/1
25 TABLET, FILM COATED ORAL 2 TIMES DAILY
Qty: 60 TABLET | Refills: 3 | Status: SHIPPED | OUTPATIENT
Start: 2023-05-18 | End: 2023-05-18

## 2023-05-18 RX ORDER — POTASSIUM CHLORIDE 20 MEQ/1
40 TABLET, EXTENDED RELEASE ORAL ONCE
Status: COMPLETED | OUTPATIENT
Start: 2023-05-18 | End: 2023-05-18

## 2023-05-18 RX ORDER — QUETIAPINE FUMARATE 25 MG/1
25 TABLET, FILM COATED ORAL NIGHTLY
Qty: 60 TABLET | Refills: 3 | Status: SHIPPED | OUTPATIENT
Start: 2023-05-18

## 2023-05-18 RX ADMIN — POTASSIUM CHLORIDE 40 MEQ: 1500 TABLET, EXTENDED RELEASE ORAL at 20:42

## 2023-05-18 RX ADMIN — SODIUM CHLORIDE, POTASSIUM CHLORIDE, SODIUM LACTATE AND CALCIUM CHLORIDE 1000 ML: 600; 310; 30; 20 INJECTION, SOLUTION INTRAVENOUS at 20:49

## 2023-05-18 ASSESSMENT — ENCOUNTER SYMPTOMS
NAUSEA: 0
ABDOMINAL PAIN: 1
SHORTNESS OF BREATH: 0
VOMITING: 0
DIARRHEA: 1

## 2023-05-18 ASSESSMENT — PAIN - FUNCTIONAL ASSESSMENT: PAIN_FUNCTIONAL_ASSESSMENT: NONE - DENIES PAIN

## 2023-05-18 NOTE — PROGRESS NOTES
[facility] Neurology Office Note      Patient:   Elise Perdue  MR#:    [id number]  Account Number:                         YOB: 1960  Date of Evaluation:  5/18/2023  Time of Note:                          2:29 PM  Primary/Referring Physician:  BENITA Vázquez - RYAN   Consulting Physician:  Reuel Boeck, APRN    NEW PATIENT CONSULTATION      Chief Complaint   Patient presents with    New Patient    Altered Mental Status       HISTORY OF PRESENT ILLNESS    Elise Perdue is a 58y.o. year old female here for evaluation and treatment of cognitive problems that began in October. She is accompanied by MECHE. The primary caregiver is Anamaria MCGREGOR. Per Anamaria Davis she just moved in 3 weeks ago to help with patient and her .  is at home on hospice with lung CA. in October patient received new diagnosis of EPI and there was an abrupt change in her mental status. She has waxing and waning periods of altered mental status with confusion, hallucinations, paranoia, bizarre behavior. She has been hospitalized 3 times now in the behavioral health once here and twice at [facility]. Was diagnosed with acute psychosis, MDD, anxiety disorder. There is loss of short term memory. Not clearly long term. She has \"spells\" where she has hallucinations where she states there are farmers on their land that need to get in the house or on the land. DIL states that when a car drives by she thinks that its a farmer coming to the house. These events occur all day on most days. Previously they were occurring intermittently throughout the week, these have progressively worsened in frequency and severity. There is also ongoing agitation and anger. Anxiety is constant. She has had SI and HI thoughts/statements to daughter in law, no actions. No SI/HI today. Daughter-in-law states that there is repetition of questions and stories and word finding issues.   Patient and daughter both relates she

## 2023-05-18 NOTE — TELEPHONE ENCOUNTER
Patient and patients daughter in law 10X10 Room stopped office. 10X10 Room had a question on one of the medications that  had wanted patient to discontinue. 10X10 Room voiced that patients pancreas does not function properly. And is unsure if patient should stop taking the Pancrelipase. I voiced that I would ask  about this and give her a call as BW is in clinic at this time. She voiced her understanding and had no other questions or concerns at this time.

## 2023-05-18 NOTE — TELEPHONE ENCOUNTER
Called and spoke with [facility]. Advise that BW had not discontinued medication that patient would stay on medication at the already prescribed dose. She voiced her understanding and had no questions at this time.

## 2023-05-18 NOTE — TELEPHONE ENCOUNTER
Called pt to schedule an appt from a referral    Scheduled with Lara Olson for 05/19/23 @ 9:30.     Electronically signed by Darnelle Hammans, MA on 5/18/2023 at 4:01 PM

## 2023-05-18 NOTE — ED PROVIDER NOTES
[facility] EMERGENCY DEPT  eMERGENCY dEPARTMENT eNCOUnter      Pt Name: Kavin Andres  MRN: [id number]  [place] 1960  Date of evaluation: 5/18/2023  Provider: BENITA Morrissey CNP    CHIEF COMPLAINT       Chief Complaint   Patient presents with    Abnormal Lab     K+ 2.8 today, pt seen at neurologist for [address] Problem     Dx psychosis today, c/o SI today without a plan         HISTORY OF PRESENT ILLNESS   (Location/Symptom, Timing/Onset,Context/Setting, Quality, Duration, Modifying Factors, Severity)  Note limiting factors. Julietta Fothergill Hammondsis a 58 y.o. female who presents to the emergency department for evaluation of abnormal lab and mental health problem. Pt is here with her son who tells me that patient has had change in mood described as being \"violent\" with him. She was evaluated at neurology office earlier today with family reporting recent diagnosis of \"psychosis\". Pt's son tells me that she has voiced thoughts of suicide to him. She has had persistent false beliefs of being poor per son. She has had no auditory of visual hallucinations. Pt tells me that she has had similar problems in past with suicidal ideations. She had outpatient labs earlier told to present to ED due to hypokalemia. Pt denies fevers, vomiting as well as any chest pain. She gives history of pancreatic insufficiency associated with abdominal pain. She denies new or worsening abdominal pain. HPI    Nursing Notes were reviewed. REVIEW OF SYSTEMS    (2-9 systems for level 4, 10 or more for level 5)     Review of Systems   Respiratory:  Negative for shortness of breath. Cardiovascular:  Negative for chest pain. Gastrointestinal:  Positive for abdominal pain and diarrhea. Negative for nausea and vomiting. Psychiatric/Behavioral:  Positive for dysphoric mood and suicidal ideas. Negative for hallucinations. All other systems reviewed and are negative.     A complete review of systems was performed and is negative

## 2023-05-18 NOTE — TELEPHONE ENCOUNTER
I called and spoke with patients daughter in law Ehsan. I advised patient of BW result note below. She voiced her understanding. I reiterated to Ehsan that patient should go to ER today as this level is critically low. She again voiced her understanding and had no questions at this time. ----- Message from BENITA Allen CNP sent at 5/18/2023  3:04 PM CDT -----  Please call and alert them that patient's potassium came back critically low, she needs to go to the hospital for evaluation and potassium replacement.

## 2023-05-19 ENCOUNTER — TELEPHONE (OUTPATIENT)
Dept: PSYCHIATRY | Age: 63
End: 2023-05-19

## 2023-05-19 VITALS
TEMPERATURE: 98.4 F | RESPIRATION RATE: 15 BRPM | DIASTOLIC BLOOD PRESSURE: 87 MMHG | WEIGHT: 94 LBS | OXYGEN SATURATION: 98 % | HEIGHT: 62 IN | BODY MASS INDEX: 17.3 KG/M2 | HEART RATE: 89 BPM | SYSTOLIC BLOOD PRESSURE: 137 MMHG

## 2023-05-19 LAB
BACTERIA BLD CULT ORG #2: NORMAL
BACTERIA BLD CULT: NORMAL
EKG P AXIS: 80 DEGREES
EKG P-R INTERVAL: 154 MS
EKG Q-T INTERVAL: 360 MS
EKG QRS DURATION: 86 MS
EKG QTC CALCULATION (BAZETT): 424 MS
EKG T AXIS: 69 DEGREES

## 2023-05-19 PROCEDURE — 93010 ELECTROCARDIOGRAM REPORT: CPT | Performed by: INTERNAL MEDICINE

## 2023-05-19 RX ORDER — LORAZEPAM 2 MG/ML
1 INJECTION INTRAMUSCULAR ONCE
Status: DISCONTINUED | OUTPATIENT
Start: 2023-05-19 | End: 2023-05-19 | Stop reason: HOSPADM

## 2023-05-19 NOTE — ED NOTES
Started transfer case with [facility]. They will try and find placement.       Wilfredo Nowak RN  05/19/23 0103

## 2023-05-19 NOTE — ED NOTES
Called pt daughter in law Janel Tony and gave an update     Glenis Shannon, [facility]  05/19/23 1649

## 2023-05-19 NOTE — ED PROVIDER NOTES
Patient's already been seen and evaluated and medically cleared. Seen by psychiatry who recommended transfer to geriatric psychiatric facility. Has been accepted in transfer to [facility] geriatric psychiatric unit. Patient's resting comfortably and updated about plan.        Lewis Snyder MD  05/19/23 8166

## 2023-05-19 NOTE — TELEPHONE ENCOUNTER
Pt's daughter called and stated that the pt is in the ER right now and is waiting transfer to a [facility] unit.   Daughter stated that she will call back to reschedule when the pt is out of the hospital.      Electronically signed by Bernice Veloz MA on 5/19/2023 at 8:21 AM

## 2023-05-19 NOTE — ED NOTES
Pt daughter in law, Ehsan , called for an update. Per pt it is ok to give pt update. Update given.  Please call with update on transfer, Greg , [facility]  05/19/23 8456

## 2023-05-19 NOTE — PROGRESS NOTES
[facility] ADULT INITIAL INTAKE ASSESSMENT     5/18/23    Edmundo Carrasquillo ,a 58 y.o. female, presents to the ED for a psychiatric assessment. ED Arrival time: [facility]  ED physician: Anahy JOY [facility] AN AFFILIATE OF [facility] Notification time: 26  [facility] Assessment start time: 1940  Psychiatrist call time: 2105  Spoke with Dr. Hong Stokes    Patient is referred by: family, and neurologist seen today d/t hypokalemia. Reason for visit to ED - Presenting problem:     Patient was seen by this author in ED room 5. She was laying on a stretcher. She is thin and somewhat frail in appearance. Her eye glasses were placed for her. She was sleeping but, awoke easily to verbal stimuli. She was able to answer questions of orientation correct on time, place and person. She was unsure why she was in ER and while she knew she was at [facility] she thought she was on the [address] not the ED. She calmly accepted location reorientation. This Cardenas Adas had to lead most of conversation with patient answering questions without elaboration unless encouraged. Patient was seen at neurologist office today, see notes from [address] Neurology. Collateral was obtained from her DIL, Nikolay Mckenzie, who is currently living with patient and her spouse and acting as primary care taker for both. PT states reason for ED visit, \"I'm not sure exactly. Yes, I have been depressed and anxious. No, I haven't been sleeping good. Yes, I have been eating good. No, I haven't really been thinking of killing myself. I am scared ! Oh, well you see my  may be passing away and all. The kids might take the money, you know from me. \"  Answers \"yes\" when asked if she has been having difficulty thinking and remembering also, answers yes to better recall of remote vs.recent past.  When asked about 'farmers' her family report her having delusions about patient stated, \"Well we don't own the property and they got a company there and yes, they come to the

## 2023-05-20 LAB
BACTERIA UR CULT: NORMAL
NUCLEAR IGG SER QL IA: NORMAL

## 2023-05-21 LAB — ELASTASE PANC STL-MCNT: 474 UG/G

## 2023-05-22 LAB
METHYLMALONATE SERPL-SCNC: 0.11 UMOL/L (ref 0–0.4)
RPR SER QL: NORMAL

## 2023-05-23 ENCOUNTER — TELEPHONE (OUTPATIENT)
Dept: GASTROENTEROLOGY | Age: 63
End: 2023-05-23

## 2023-05-23 LAB
BACTERIA BLD CULT ORG #2: NORMAL
BACTERIA BLD CULT: NORMAL
VIT B1 BLD-MCNC: 249 NMOL/L (ref 70–180)

## 2023-05-23 NOTE — TELEPHONE ENCOUNTER
Patient daughter in law Trish Reeves returned call to office.  Best time to reach her is after 4pm today or anytime tomorrow [phone]      Thank you

## 2023-05-23 NOTE — TELEPHONE ENCOUNTER
----- Message from BENITA Fregoso sent at 5/23/2023  9:26 AM CDT -----  Fecal Elastase is normal, this tells us that the pancreaze and creon regimen that she is on is working

## 2023-06-06 ENCOUNTER — TELEPHONE (OUTPATIENT)
Dept: PSYCHIATRY | Age: 63
End: 2023-06-06

## 2023-06-06 NOTE — TELEPHONE ENCOUNTER
Called pt for appointment reminder.   -left voicemail, requesting a return call      Electronically signed by Jhony Wan MA on 6/6/2023 at 1:20 PM

## 2023-06-06 NOTE — PROGRESS NOTES
hallucinations  If present describe: N/A     Patient rates their -- Depression: 1-10:  8  Anxiety:1-10:  5     Sleeping:YesSleep: Yes,   Sleep Quality Good   Hours Slept: 8   Sleep Medications: Yes  PRN Sleep Meds: No      Taking medication: Yes     Misc: No

## 2023-06-07 ENCOUNTER — OFFICE VISIT (OUTPATIENT)
Dept: PSYCHIATRY | Age: 63
End: 2023-06-07

## 2023-06-07 VITALS
HEIGHT: 63 IN | SYSTOLIC BLOOD PRESSURE: 153 MMHG | TEMPERATURE: 98.1 F | HEART RATE: 96 BPM | BODY MASS INDEX: 16.41 KG/M2 | OXYGEN SATURATION: 99 % | WEIGHT: 92.6 LBS | DIASTOLIC BLOOD PRESSURE: 98 MMHG

## 2023-06-07 DIAGNOSIS — G47.00 INSOMNIA, UNSPECIFIED TYPE: Chronic | ICD-10-CM

## 2023-06-07 DIAGNOSIS — F41.1 GENERALIZED ANXIETY DISORDER: Primary | Chronic | ICD-10-CM

## 2023-06-07 DIAGNOSIS — R41.89 COGNITIVE IMPAIRMENT: ICD-10-CM

## 2023-06-07 RX ORDER — MIRTAZAPINE 7.5 MG/1
3.75 TABLET, FILM COATED ORAL NIGHTLY
Qty: 15 TABLET | Refills: 0 | Status: SHIPPED | OUTPATIENT
Start: 2023-06-07

## 2023-06-07 ASSESSMENT — PATIENT HEALTH QUESTIONNAIRE - PHQ9
SUM OF ALL RESPONSES TO PHQ QUESTIONS 1-9: 27
2. FEELING DOWN, DEPRESSED OR HOPELESS: 3
10. IF YOU CHECKED OFF ANY PROBLEMS, HOW DIFFICULT HAVE THESE PROBLEMS MADE IT FOR YOU TO DO YOUR WORK, TAKE CARE OF THINGS AT HOME, OR GET ALONG WITH OTHER PEOPLE: 3
8. MOVING OR SPEAKING SO SLOWLY THAT OTHER PEOPLE COULD HAVE NOTICED. OR THE OPPOSITE, BEING SO FIGETY OR RESTLESS THAT YOU HAVE BEEN MOVING AROUND A LOT MORE THAN USUAL: 3
9. THOUGHTS THAT YOU WOULD BE BETTER OFF DEAD, OR OF HURTING YOURSELF: 3
SUM OF ALL RESPONSES TO PHQ QUESTIONS 1-9: 27
4. FEELING TIRED OR HAVING LITTLE ENERGY: 3
6. FEELING BAD ABOUT YOURSELF - OR THAT YOU ARE A FAILURE OR HAVE LET YOURSELF OR YOUR FAMILY DOWN: 3
SUM OF ALL RESPONSES TO PHQ QUESTIONS 1-9: 27
7. TROUBLE CONCENTRATING ON THINGS, SUCH AS READING THE NEWSPAPER OR WATCHING TELEVISION: 3
SUM OF ALL RESPONSES TO PHQ9 QUESTIONS 1 & 2: 6
1. LITTLE INTEREST OR PLEASURE IN DOING THINGS: 3
5. POOR APPETITE OR OVEREATING: 3
3. TROUBLE FALLING OR STAYING ASLEEP: 3
SUM OF ALL RESPONSES TO PHQ QUESTIONS 1-9: 24

## 2023-06-07 ASSESSMENT — COLUMBIA-SUICIDE SEVERITY RATING SCALE - C-SSRS
1. WITHIN THE PAST MONTH, HAVE YOU WISHED YOU WERE DEAD OR WISHED YOU COULD GO TO SLEEP AND NOT WAKE UP?: YES
6. HAVE YOU EVER DONE ANYTHING, STARTED TO DO ANYTHING, OR PREPARED TO DO ANYTHING TO END YOUR LIFE?: NO
2. HAVE YOU ACTUALLY HAD ANY THOUGHTS OF KILLING YOURSELF?: NO

## 2023-06-07 NOTE — PROGRESS NOTES
PSYCHIATRIC EVALUATION    Date of Service:  23     Chief Complaint   Patient presents with    Medication Check    New Patient       HISTORY OF PRESENT ILLNESS  The patient is a 58 y.o.   female who is here for psychiatric evaluation due to continual complaints of depression. She has had depression majority of her life. She has been on celexa and lexapro previously with some benefit. Today: accompanied by Umer Lezama (daughter in law)  Patient was recently discharged from [facility] geriatric psychiatric unit. Prior to being transferred to the patient was in the ER stating that she had been depressed and anxious poor sleep and feeling scared. She said she was afraid that her  was passing away and that her children are going to steal all of her money. The ER note indicated that she was having difficulty thinking and remembering answers. She was diagnosed with EPI in 2022 and since then has not been doing well. Her  passed away In may. Her daughter in law says she has had a lot of issues with paranoia. She says this happens about everyday. Umer Lezama states that she is always concerned that someone will be breaking into their home, that her 's  has not been fully paid for, or that their land is going to be annexed. At this time she was able to process reasonably however Umer Lezama states that this is not always the case. After reviewing medications we discussed a taper schedule for BuSpar, discontinuing trazodone, Namenda, and Aricept. Additionally we discussed decreasing Zoloft to 50 mg daily as well as beginning Remeron 3.75 mg nightly. He is bright, and cooperative she denies SI HI AVH she denies side effects of medications other than possibly stated above. Slums was administered at this time patient scored 11 out of 30 this could be related to excessive medication polypharmacy we will follow-up in 3 weeks and reassess.     Sleep: couple of hours

## 2023-06-07 NOTE — PATIENT INSTRUCTIONS
1. Continue   Atarax 25 mg 4 times a day as needed  Seroquel 50 mg nightly    Taper  BuSpar 15 mg 3 times a day for 3 days,   then decrease to 10 mg 3 times a day for 5 days,   then decrease to 5 mg 3 times a day for 5 days   then 5 mg 2 times a day, then 5 mg 1 time a day then stop.      decrease  Zoloft 50 mg daily     Start   Remeron 3.75 mg nightly      Discontinue   Trazodone 100 mg nightly  Aricept 10 mg nightly  Namenda 5 mg 2 times daily

## 2023-06-26 ENCOUNTER — TELEPHONE (OUTPATIENT)
Dept: PSYCHIATRY | Age: 63
End: 2023-06-26

## 2023-06-26 RX ORDER — MIRTAZAPINE 7.5 MG/1
3.75 TABLET, FILM COATED ORAL NIGHTLY
Qty: 15 TABLET | Refills: 0 | Status: SHIPPED | OUTPATIENT
Start: 2023-06-26

## 2023-06-27 ENCOUNTER — TELEPHONE (OUTPATIENT)
Dept: PSYCHIATRY | Age: 63
End: 2023-06-27

## 2025-06-11 NOTE — PROGRESS NOTES
Department of Psychiatry  Attending Progress Note     Chief complaint: \"I'm better\"    SUBJECTIVE:   Chart reviewed, discussed with the team. No major issues overnight. Patient is med-compliant. No SEs. Performs ADLs. Less confused. No issues with sleep and appetite. Patient seen in the TV area. Calm and cooperative. Oriented to person, place, date, situation. States she slept well. Denies SI/HI/AVH. Rates depression 5/10, anxiety 3/10. Slept 7h. Denies physical complaints. Reports feeling less confused. States she cannot get used to the unit and at times she has to ask nurses where everything is. Reassurance provided. Encourage patient to participate in groups today. She was receptive. Hoping to go home soon.  is supportive. OBJECTIVE    Physical  Wt Readings from Last 3 Encounters:   10/12/22 89 lb 2 oz (40.4 kg)   10/10/22 92 lb 9.6 oz (42 kg)   04/09/22 114 lb (51.7 kg)     Temp Readings from Last 3 Encounters:   10/12/22 97.3 °F (36.3 °C) (Oral)   10/10/22 97.9 °F (36.6 °C) (Temporal)   04/09/22 98 °F (36.7 °C) (Temporal)     BP Readings from Last 3 Encounters:   10/12/22 105/78   10/10/22 117/75   04/09/22 110/62     Pulse Readings from Last 3 Encounters:   10/12/22 (!) 103   10/10/22 78   04/09/22 70        Review of Systems: 14-point review of systems negative except as described above    Mental Status Examination:   Appearance: Stated age. Thin. Wears glasses. Gait stable. No abnormal movements or tremor. Behavior: Calm, cooperative  Speech: Normal in tone, volume, and quality. No slurring, dysarthria or pressured speech noted. Mood: \"Better\"   Affect: Mood congruent. Thought Process: Appears linear. Thought Content: Denies suicidal and homicidal ideation. No overt delusions or paranoia appreciated. Perceptions: Denies auditory or visual hallucinations at present time. Not responding to internal stimuli. Concentration: Intact.    Orientation: to person, place, date, and SUBJECTIVE:  See the nurses note.  I agree with the content.  She has pain involving the 3rd and 4th toes with swelling and a change in the appearance of the 3rd toe.  She can walk if she walks on the outside of her foot.    OBJECTIVE:  Alert and pleasant.  VSS.  Afebrile.  Neuro to lower extremities intact.  Right foot shows swelling at the base of the 3rd and 4th toes with slight lateral displacement of toe.  Xray shows displaced fracture of proximal phalanx 3rd toe    ASSESSMENT:  1) Fracture as described.    PLAN:   Short boot applied.  Service to orthopedics for definitive care.  Celebrex 200 mg bid for swelling and discomfort.     situation. Language: Intact. Fund of information: Intact. Memory: Recent impaired and remote appear intact. Neurovegitative: Improving appetite and sleep. Insight: Improving. Judgment: Improving.     Data  Lab Results   Component Value Date    WBC 8.3 10/10/2022    HGB 12.6 10/10/2022    HCT 37.4 10/10/2022    MCV 95.4 10/10/2022     10/10/2022      Lab Results   Component Value Date     10/10/2022    K 3.6 10/10/2022     10/10/2022    CO2 23 10/10/2022    BUN 10 10/10/2022    CREATININE 0.4 (L) 10/10/2022    GLUCOSE 99 10/10/2022    CALCIUM 9.0 10/10/2022    PROT 6.5 (L) 10/08/2022    LABALBU 4.0 10/08/2022    BILITOT 0.3 10/08/2022    ALKPHOS 63 10/08/2022    AST 24 10/08/2022    ALT 25 10/08/2022    LABGLOM >60 10/10/2022    GFRAA >59 10/10/2022       Medications    Current Facility-Administered Medications:     acetaminophen (TYLENOL) tablet 650 mg, 650 mg, Oral, Q4H PRN, Gilda Prieto MD    risperiDONE (RISPERDAL M-TABS) disintegrating tablet 1 mg, 1 mg, Oral, Q6H PRN, Lenell Scheuermann, MD    gabapentin (NEURONTIN) capsule 300 mg, 300 mg, Oral, TID, Lenell Scheuermann, MD, 300 mg at 10/12/22 0840    fenofibrate (TRIGLIDE) tablet 160 mg, 160 mg, Oral, Daily, BENITA Guidry - CNP, 160 mg at 10/12/22 0840    lipase-protease-amylase (CREON) delayed release capsule 12,000 Units, 12,000 Units, Oral, TID WC, BENITA Guidry - CNP, 12,000 Units at 10/12/22 0840    pravastatin (PRAVACHOL) tablet 40 mg, 40 mg, Oral, Nightly, BENITA Guidry - CNP, 40 mg at 10/11/22 2046    mirtazapine (REMERON) tablet 3.75 mg, 3.75 mg, Oral, Nightly, Lenell Scheuermann, MD, 3.75 mg at 10/11/22 2046    melatonin tablet 3 mg, 3 mg, Oral, Nightly, Lenell Scheuermann, MD, 3 mg at 10/11/22 2046    hydrOXYzine HCl (ATARAX) tablet 25 mg, 25 mg, Oral, TID PRN, Lenell Scheuermann, MD, 25 mg at 10/11/22 2046    polyethylene glycol (GLYCOLAX) packet 17 g, 17 g, Oral, Daily PRN, Lenell Scheuermann, MD    nicotine (NICODERM CQ) 14 MG/24HR 1 patch, 1 patch, TransDERmal, Daily, Rere Land MD, 1 patch at 10/12/22 0839    ASSESSMENT AND PLAN  DSM 5 DIAGNOSIS  Impression  Depression unspecified  Generalized anxiety disorder  Cognitive impairment   R/o Benzodizepine withdrawal   Insomnia unspecified  Failure to thrive  Pancreatic insufficiency  Chronic pain    Mental status is improving. Continue to observe. SLUMS tomorrow. Plan:   1. Psychiatric Medications:   Continue current psychotropic medications as recommended. Monitor for side effects. The risks, benefits, side effects, indications, contraindications, alternatives and adverse effects of the medications have been discussed with patient. 2. Continue to provide supportive psychotherapy. Encourage socialization and participation in recreational activities. Work on coping skills. 3. Medical Issues:    Continue medical monitoring by Dr. Benjamin Bell and associates. MOCA, score 18/30 on 10/11/2022.     4. Disposition:     to provide outpatient resources and facilitate disposition.      Amount of time spent with patient:      35 minutes with greater than 50 % of the time spent in counseling and collaboration of care